# Patient Record
Sex: FEMALE | Race: ASIAN | NOT HISPANIC OR LATINO | Employment: PART TIME | URBAN - METROPOLITAN AREA
[De-identification: names, ages, dates, MRNs, and addresses within clinical notes are randomized per-mention and may not be internally consistent; named-entity substitution may affect disease eponyms.]

---

## 2023-02-08 ENCOUNTER — OFFICE VISIT (OUTPATIENT)
Dept: OBGYN CLINIC | Facility: CLINIC | Age: 48
End: 2023-02-08

## 2023-02-08 ENCOUNTER — TELEPHONE (OUTPATIENT)
Dept: GYNECOLOGIC ONCOLOGY | Facility: CLINIC | Age: 48
End: 2023-02-08

## 2023-02-08 ENCOUNTER — APPOINTMENT (OUTPATIENT)
Dept: LAB | Facility: CLINIC | Age: 48
End: 2023-02-08

## 2023-02-08 VITALS
SYSTOLIC BLOOD PRESSURE: 122 MMHG | HEIGHT: 63 IN | DIASTOLIC BLOOD PRESSURE: 62 MMHG | BODY MASS INDEX: 28.46 KG/M2 | WEIGHT: 160.6 LBS

## 2023-02-08 DIAGNOSIS — N84.1 CERVICAL POLYP: ICD-10-CM

## 2023-02-08 DIAGNOSIS — R19.00 PELVIC MASS: Primary | ICD-10-CM

## 2023-02-08 DIAGNOSIS — R19.00 ABDOMINAL MASS, UNSPECIFIED ABDOMINAL LOCATION: Primary | ICD-10-CM

## 2023-02-08 DIAGNOSIS — N95.0 POSTMENOPAUSAL BLEEDING: ICD-10-CM

## 2023-02-08 DIAGNOSIS — R19.00 ABDOMINAL MASS, UNSPECIFIED ABDOMINAL LOCATION: ICD-10-CM

## 2023-02-08 LAB
ESTRADIOL SERPL-MCNC: 45 PG/ML
FSH SERPL-ACNC: 28.7 MIU/ML

## 2023-02-08 RX ORDER — DIPHENOXYLATE HYDROCHLORIDE AND ATROPINE SULFATE 2.5; .025 MG/1; MG/1
1 TABLET ORAL DAILY
COMMUNITY

## 2023-02-08 RX ORDER — LAMOTRIGINE 100 MG/1
TABLET ORAL
COMMUNITY
Start: 2018-02-06

## 2023-02-08 NOTE — TELEPHONE ENCOUNTER
Patient called into the office to schedule an appointment   Patient can be reached back @ [phone]

## 2023-02-08 NOTE — TELEPHONE ENCOUNTER
Patient scheduled with Dr Germaine Ruiz on 2/13/23  Plan for pre-visit CT imaging  Order placed  Reviewed allergy list as well as Network IV prep protocol  Patient does not require pre-medication for shellfish allergy, as her reaction is not classified as "severe" per protocol

## 2023-02-08 NOTE — PROGRESS NOTES
Gynecology   Troy Arambula 52 y o  female MRN: [id number]    Assessment/Plan     1  Abdominal mass, unspecified abdominal location  2  Postmenopausal bleeding  3  Cervical polyp    - Follicle stimulating hormone; Future  - Estradiol; Future  - IGP, Aptima HPV, Rfx 16/18,45  - Pathology Report  - GYN ONC; spoke with Dr Jaleesa Vickers re: appointment, imaging  They will see patient ASAP then decide on further imaging  HPI:  Troy Arambula is a 52 y o  female who presents with 1 5 year h/o abdominal distension and intermittent VB  Notes menopause @ age 45  Of note, struggled with anorexia from depression at the time  Denies any menopausal symptoms  Historical Information   Past Medical History:   Diagnosis Date   • Depression     Lamictal to control bipolar   • Epilepsy ([facility] [address] )     Teenage into adulthood  No seizures in 19 years   • Miscarriage     Elective  @ 12years old  Miscarriage between 1st and 2nd child   • Varicella     Childhood, 3rd grade or so  Very mild case     Past Surgical History:   Procedure Laterality Date   • TUBAL LIGATION       OB/GYN History:   OB History        4    Para   2    Term   2            AB   1    Living   2       SAB   1    IAB        Ectopic        Multiple        Live Births   2                 History reviewed  No pertinent family history    Social History   Social History     Substance and Sexual Activity   Alcohol Use Not Currently     Social History     Substance and Sexual Activity   Drug Use Yes   • Types: Marijuana     Social History     Tobacco Use   Smoking Status Every Day   • Packs/day: 0 50   • Years: 25 00   • Pack years: 12 50   • Types: Cigarettes   Smokeless Tobacco Never     E-Cigarette/Vaping   • E-Cigarette Use Never User      E-Cigarette/Vaping Substances   • Nicotine No    • THC No    • CBD No    • Flavoring No    • Other No    • Unknown No        Meds/Allergies   Current Outpatient Medications on File Prior to Visit   Medication Sig   • CALCIUM PO Take by mouth   • Dihydrotachysterol (DHT PO) Take by mouth   • lamoTRIgine (LaMICtal) 100 mg tablet    • multivitamin (THERAGRAN) TABS Take 1 tablet by mouth daily   • VITAMIN D PO Take by mouth     No current facility-administered medications on file prior to visit  Allergies   Allergen Reactions   • Latex Hives, Itching and Rash   • Medical Tape Hives, Itching and Rash   • Shellfish Allergy - Food Allergy Hives, Itching, Rash, Swelling and Vomiting       Review of Systems   Constitutional: Negative for decreased appetite, fever, malaise/fatigue and weight loss  Respiratory: Negative for cough, shortness of breath, sputum production and wheezing  Gastrointestinal: Positive for bloating  Negative for abdominal pain, change in bowel habit and nausea  Genitourinary: Positive for non-menstrual bleeding  Negative for flank pain, pelvic pain and urgency  Objective   Vitals: Blood pressure 122/62, height 5' 2 5" (1 588 m), weight 72 8 kg (160 lb 9 6 oz), last menstrual period 12/26/2022  Physical Exam  Constitutional:       Appearance: Normal appearance  Genitourinary:      Vulva normal       No inguinal adenopathy present in the right or left side  No vaginal discharge or bleeding  Adnexa exam comments: Large abdominopelvic mass extending into epigatrium  Cervical polyp present  Uterus is not enlarged or tender  HENT:      Head: Normocephalic  Cardiovascular:      Rate and Rhythm: Normal rate and regular rhythm  Pulmonary:      Effort: Pulmonary effort is normal    Abdominal:      General: There is distension  Palpations: Abdomen is soft  There is mass  Tenderness: There is no abdominal tenderness  Musculoskeletal:         General: No swelling  Lymphadenopathy:      Lower Body: No right inguinal adenopathy  No left inguinal adenopathy  Neurological:      General: No focal deficit present        Mental Status: She is alert and oriented to person, place, and time  Skin:     General: Skin is warm and dry  Coloration: Skin is not jaundiced or pale  Psychiatric:         Mood and Affect: Mood normal          Behavior: Behavior normal    Vitals reviewed  Limited bedside TA sono:  Uterus appears small and compressed in the pelvis  Large mass with mixed fluid and solid components extending to approximately 54UP above umbilicus  Possible ovarian tissue seen in RUQ  Slight tender in LLQ

## 2023-02-09 ENCOUNTER — TELEPHONE (OUTPATIENT)
Dept: OBGYN CLINIC | Facility: CLINIC | Age: 48
End: 2023-02-09

## 2023-02-09 ENCOUNTER — APPOINTMENT (OUTPATIENT)
Dept: LAB | Facility: CLINIC | Age: 48
End: 2023-02-09

## 2023-02-09 ENCOUNTER — TELEPHONE (OUTPATIENT)
Dept: GYNECOLOGIC ONCOLOGY | Facility: CLINIC | Age: 48
End: 2023-02-09

## 2023-02-09 DIAGNOSIS — R19.00 PELVIC MASS: ICD-10-CM

## 2023-02-09 DIAGNOSIS — R19.00 PELVIC MASS: Primary | ICD-10-CM

## 2023-02-09 LAB
BUN SERPL-MCNC: 6 MG/DL (ref 5–25)
CREAT SERPL-MCNC: 0.8 MG/DL (ref 0.6–1.3)
GFR SERPL CREATININE-BSD FRML MDRD: 88 ML/MIN/1.73SQ M

## 2023-02-09 NOTE — TELEPHONE ENCOUNTER
Called and informed of there CT SCAN appointment  Provided the patient with the date, time and location  Patient was in agreement of this  Answered all questions prior to ending the call

## 2023-02-09 NOTE — TELEPHONE ENCOUNTER
----- Message from Dariusz Pratt MD sent at 2/9/2023 10:51 AM EST -----  Notify she is not menopausal; she is seeing GYN ONC on Monday

## 2023-02-10 ENCOUNTER — TELEPHONE (OUTPATIENT)
Dept: OBGYN CLINIC | Facility: CLINIC | Age: 48
End: 2023-02-10

## 2023-02-10 NOTE — TELEPHONE ENCOUNTER
Spoke with Shante at [address] for Cervical Polyp was Cervical polyp- Not endometrium

## 2023-02-11 ENCOUNTER — HOSPITAL ENCOUNTER (OUTPATIENT)
Dept: RADIOLOGY | Facility: HOSPITAL | Age: 48
Discharge: HOME | End: 2023-02-11

## 2023-02-11 DIAGNOSIS — R19.00 PELVIC MASS: ICD-10-CM

## 2023-02-11 RX ADMIN — IOHEXOL 100 ML: 350 INJECTION, SOLUTION INTRAVENOUS at 16:07

## 2023-02-13 ENCOUNTER — CONSULT (OUTPATIENT)
Dept: GYNECOLOGIC ONCOLOGY | Facility: CLINIC | Age: 48
End: 2023-02-13

## 2023-02-13 VITALS
BODY MASS INDEX: 28.53 KG/M2 | HEIGHT: 63 IN | DIASTOLIC BLOOD PRESSURE: 80 MMHG | OXYGEN SATURATION: 98 % | WEIGHT: 161 LBS | TEMPERATURE: 97.5 F | SYSTOLIC BLOOD PRESSURE: 120 MMHG | HEART RATE: 97 BPM

## 2023-02-13 DIAGNOSIS — Z12.31 ENCOUNTER FOR SCREENING MAMMOGRAM FOR MALIGNANT NEOPLASM OF BREAST: ICD-10-CM

## 2023-02-13 DIAGNOSIS — R19.00 ABDOMINAL MASS, UNSPECIFIED ABDOMINAL LOCATION: ICD-10-CM

## 2023-02-13 DIAGNOSIS — R19.00 PELVIC MASS: Primary | ICD-10-CM

## 2023-02-13 LAB
CYTOLOGIST CVX/VAG CYTO: NORMAL
DX ICD CODE: NORMAL
HPV GENOTYPE REFLEX: NORMAL
HPV I/H RISK 4 DNA CVX QL PROBE+SIG AMP: NEGATIVE
OTHER STN SPEC: NORMAL
PATH REPORT.FINAL DX SPEC: NORMAL
PATH REPORT.SITE OF ORIGIN SPEC: NORMAL
PAYMENT PROCEDURE: NORMAL
SL AMB .: NORMAL
SL AMB NOTE:: NORMAL
SL AMB SPECIMEN ADEQUACY: NORMAL
SL AMB TEST METHODOLOGY: NORMAL

## 2023-02-13 RX ORDER — HEPARIN SODIUM 5000 [USP'U]/ML
5000 INJECTION, SOLUTION INTRAVENOUS; SUBCUTANEOUS
Status: CANCELLED | OUTPATIENT
Start: 2023-02-23 | End: 2023-02-24

## 2023-02-13 RX ORDER — GABAPENTIN 100 MG/1
100 CAPSULE ORAL ONCE
Status: CANCELLED | OUTPATIENT
Start: 2023-02-23 | End: 2023-02-13

## 2023-02-13 RX ORDER — ACETAMINOPHEN 325 MG/1
975 TABLET ORAL ONCE
Status: CANCELLED | OUTPATIENT
Start: 2023-02-23 | End: 2023-02-13

## 2023-02-13 RX ORDER — CELECOXIB 200 MG/1
200 CAPSULE ORAL ONCE
Status: CANCELLED | OUTPATIENT
Start: 2023-02-23 | End: 2023-02-13

## 2023-02-13 NOTE — ASSESSMENT & PLAN NOTE
Approximately 22 cm simple appearing cystic large abdominal pelvic mass  Likely arising from one of the ovaries  I discussed with patient differential diagnosis including benign, borderline and malignant pathology  Likelihood of borderline or malignancy appears quite small based on patient's history and imaging characteristics  Regardless, given symptomatic nature and size I have recommended definitive surgical therapy and patient has consented to proceed with possible robotic versus open resection of pelvic mass, possible hysterectomy, bowel salpingo-oophorectomy, staging and all other indicated procedures  Patient has had some issues with oligomenorrhea alternating with unpredictable episodes of bleeding and after discussing the potential benefit of proceeding with hysterectomy to allow for contained vaginal delivery as opposed to performing laparotomy which would result in more pain and slower recovery, patient has agreed to proceed with hysterectomy and bilateral salpingectomy  If contralateral ovary appears healthy, and after discussing pros and cons of ovarian preservation, she has elected to keep contralateral ovary in situ  Patient has excellent exercise tolerance and no additional cardiovascular work-up is indicated  Preoperative testing as per procedure pass will include type and screen, CBC and hemoglobin A1c  Will defer tumor markers to perioperative period once frozen section is obtained as those may be unnecessary and noninformative  I discussed with patient indication, risks, benefits and alternatives of surgical exploration  We discussed possibility of bleeding requiring blood transfusion, life-threatening infections requiring additional procedures, injuries to surrounding organs such as bladder, ureters, gastrointestinal tract and or neurovascular structures    Additionally, we discussed general risks associated to stress of surgery such as venous thromboembolism, acute myocardial events and stroke  All questions answered to patient's satisfaction  Patient consents to blood transfusions if those are deemed necessary during the course of her care, she understands potential risks include but are not limited to hypersensitivity reactions and infections with blood-borne pathogens she agrees and wants to proceed  Informed consent form signed

## 2023-02-13 NOTE — LETTER
February 13, 2023     Albaro Martinez, [address]     Patient: Savage Organ   YOB: 1975   Date of Visit: 2/13/2023       Dear Samia Allen: Thank you for referring Thompson Organ to me for evaluation  Below are my notes for this consultation  If you have questions, please do not hesitate to call me  I look forward to following your patient along with you  Sincerely,        Obdulio Crawford MD        CC: No Recipients  Obdulio Crawford MD  2/13/2023  3:57 PM  Incomplete  Assessment/Plan:    Problem List Items Addressed This Visit        Other    Pelvic mass - Primary     Approximately 22 cm simple appearing cystic large abdominal pelvic mass  Likely arising from one of the ovaries  I discussed with patient differential diagnosis including benign, borderline and malignant pathology  Likelihood of borderline or malignancy appears quite small based on patient's history and imaging characteristics  Regardless, given symptomatic nature and size I have recommended definitive surgical therapy and patient has consented to proceed with possible robotic versus open resection of pelvic mass, possible hysterectomy, bowel salpingo-oophorectomy, staging and all other indicated procedures  Patient has had some issues with oligomenorrhea alternating with unpredictable episodes of bleeding and after discussing the potential benefit of proceeding with hysterectomy to allow for contained vaginal delivery as opposed to performing laparotomy which would result in more pain and slower recovery, patient has agreed to proceed with hysterectomy and bilateral salpingectomy  If contralateral ovary appears healthy, and after discussing pros and cons of ovarian preservation, she has elected to keep contralateral ovary in situ  Patient has excellent exercise tolerance and no additional cardiovascular work-up is indicated    Preoperative testing as per procedure pass will include type and screen, CBC and hemoglobin A1c  Will defer tumor markers to perioperative period once frozen section is obtained as those may be unnecessary and noninformative  I discussed with patient indication, risks, benefits and alternatives of surgical exploration  We discussed possibility of bleeding requiring blood transfusion, life-threatening infections requiring additional procedures, injuries to surrounding organs such as bladder, ureters, gastrointestinal tract and or neurovascular structures  Additionally, we discussed general risks associated to stress of surgery such as venous thromboembolism, acute myocardial events and stroke  All questions answered to patient's satisfaction  Patient consents to blood transfusions if those are deemed necessary during the course of her care, she understands potential risks include but are not limited to hypersensitivity reactions and infections with blood-borne pathogens she agrees and wants to proceed  Informed consent form signed  Relevant Orders    Mammo screening bilateral w 3d & cad    Case request operating room: POSSIBLE ROBOTIC VS  OPEN RESECTION OF PELVIC MASS, POSSIBLE HYSTERECTOMY, BILATERAL SALPINGO-OOPHORECTOMY, STAGING AND ALL OTHER INDICATED PROCEDURES (Completed)    CBC and differential    Type and screen    Encounter for screening mammogram for malignant neoplasm of breast     Overdue for mammogram   Mammogram ordered  Relevant Orders    Mammo screening bilateral w 3d & cad   Other Visit Diagnoses     Abdominal mass, unspecified abdominal location        Relevant Orders    Case request operating room: POSSIBLE ROBOTIC VS  OPEN RESECTION OF PELVIC MASS, POSSIBLE HYSTERECTOMY, BILATERAL SALPINGO-OOPHORECTOMY, STAGING AND ALL OTHER INDICATED PROCEDURES (Completed)    CBC and differential    Type and screen            CHIEF COMPLAINT:   Here for consultation definitive treatment for large abdominal pelvic mass      Patient ID: Jesusita Romberg is a 52 y o  female  HPI  49-year-old G4, P2 with 2 prior vaginal deliveries and history of well-controlled bipolar disorder and seizure disorder  Last seizure more than 15 years ago  She has noted increase in abdominal girth which is now attributable to pelvic mass progressing over the last 1 5 to 2 years  She did not seek care earlier due to changes in her 's employment situation and insurance barriers  Recently consulted with Dr Tristan Castillo and office bedside ultrasound demonstrated large cystic abdominal pelvic mass  After discussion with us we decided to order a previous it CT scan of the chest, abdomen and pelvis with contrast which has confirmed the presence of a 22 cm simple appearing cystic ovarian lesion  No lymphadenopathy  No ascites  No carcinomatosis  No  stigmata of malignancy noted  Patient presents today to discuss potential management options  Reports abdominal pelvic heaviness  Of note she also reports prolonged episodes of oligomenorrhea alternating with episodes of rectal bleeding  Recent FSH ordered by primary gynecology confirms premenopausal status    Never had a mammogram    No family history of malignancy  The following portions of the patient's history were reviewed and updated as appropriate: allergies, current medications, past family history, past medical history, past social history, past surgical history and problem list     Review of Systems  As per [facility]  Top review of systems otherwise noncontributory  Current Outpatient Medications   Medication Sig Dispense Refill   • CALCIUM PO Take by mouth     • Dihydrotachysterol (DHT PO) Take by mouth     • lamoTRIgine (LaMICtal) 100 mg tablet      • multivitamin (THERAGRAN) TABS Take 1 tablet by mouth daily     • VITAMIN D PO Take by mouth       No current facility-administered medications for this visit             Objective:    Blood pressure 120/80, pulse 97, temperature 97 5 °F (36 4 °C), temperature source Temporal, height 5' 2 5" (1 588 m), weight 73 kg (161 lb), SpO2 98 %  Body mass index is 28 98 kg/m²  Body surface area is 1 75 meters squared  Physical Exam  Vitals reviewed  Exam conducted with a chaperone present  Constitutional:       Appearance: Normal appearance  She is not ill-appearing or toxic-appearing  Eyes:      General: No scleral icterus  Right eye: No discharge  Left eye: No discharge  Conjunctiva/sclera: Conjunctivae normal    Cardiovascular:      Rate and Rhythm: Normal rate and regular rhythm  Heart sounds: Normal heart sounds  No murmur heard  Pulmonary:      Effort: Pulmonary effort is normal  No respiratory distress  Breath sounds: Normal breath sounds  No stridor  No wheezing or rhonchi  Abdominal:      General: There is distension  Palpations: There is mass (Large abdominal pelvic mass extends from suprapubic region to the epigastrium  )  Tenderness: There is no abdominal tenderness  Hernia: No hernia is present  Genitourinary:     Comments: Normal external female genitalia  Normal [place]tholin's and [place]'s glands  Normal urethral meatus and no evidence of urethral discharge or masses  Bladder without fullness mass or tenderness  Vagina without lesion or discharge  No significant pelvic organ prolapse noted  Cervix with nabothian cysts, grossly normal appearing without visible lesions  Large abdominopelvic mass extends to the epigastrium and appears smooth, mobile and nontender  Anus without fissure of lesion  Musculoskeletal:      Right lower leg: No edema  Left lower leg: No edema  Neurological:      Mental Status: She is alert  2/11/2023 Study Result    Narrative & Impression   CT CHEST, ABDOMEN AND PELVIS WITH IV CONTRAST     INDICATION: Pelvic mass  R19 00:  Intra-abdominal and pelvic swelling, mass and lump, unspecified site      COMPARISON:  None      TECHNIQUE: CT examination of the chest, abdomen and pelvis was performed  Axial, sagittal, and coronal 2D reformatted images were created from the source data and submitted for interpretation      Radiation dose length product (DLP) for this visit:  826 05 mGy-cm   This examination, like all CT scans performed in the [facility], was performed utilizing techniques to minimize radiation dose exposure, including the use of iterative   reconstruction and automated exposure control      IV Contrast:  100 mL of iohexol (OMNIPAQUE)  Enteric Contrast: Enteric contrast was administered      FINDINGS:     CHEST     LUNGS:  Lungs are clear  There is no tracheal or endobronchial lesion      PLEURA:  Unremarkable      HEART/GREAT VESSELS: Heart is unremarkable for patient's age  No thoracic aortic aneurysm  Small right pericardial cyst measuring up to 1 7 cm      MEDIASTINUM AND ESTIVEN:  Unremarkable      CHEST WALL AND LOWER NECK:  Unremarkable      ABDOMEN     LIVER/BILIARY TREE:  One or more subcentimeter sharply circumscribed low-density hepatic lesion(s) are noted, too small to accurately characterize, but statistically most likely to represent subcentimeter hepatic cysts  No suspicious solid hepatic   lesion is identified  Hepatic contours are normal   No biliary dilatation      GALLBLADDER:  There are gallstone(s) within the gallbladder, without pericholecystic inflammatory changes      SPLEEN:  Unremarkable      PANCREAS:  Unremarkable      ADRENAL GLANDS:  Unremarkable      KIDNEYS/URETERS:  Mild bilateral hydroureteronephrosis secondary to the large pelvic mass      STOMACH AND BOWEL:  Displaced bowel laterally on both sides of the abdomen secondary to the large pelvic mass  No acute findings      APPENDIX:  No findings to suggest appendicitis      ABDOMINOPELVIC CAVITY:  Mild pelvic ascites  No free air      VESSELS:  Unremarkable for patient's age      PELVIS     REPRODUCTIVE ORGANS:  Large cystic mass arising from the pelvis   Axial measurements of 21 8 x 14 7 cm  This measures 21 4 cm in cranial caudad dimension  Difficult to assess the origin given the size but probably arising from the right adnexa  Peripheral   isodensity at the left aspect of the cystic mass could be related to wall thickening  Otherwise no obvious solid component      Patchy focal hyperdensity at the uterus anteriorly may be related to underlying fibroid      URINARY BLADDER:  Unremarkable      ABDOMINAL WALL/INGUINAL REGIONS:  Unremarkable      OSSEOUS STRUCTURES:  No acute fracture or destructive osseous lesion      IMPRESSION:     1   Large cystic mass probably arising from the right adnexa with maximum measurement of 21 8 cm  Findings suspicious for ovarian neoplasm  2  Mild bilateral hydroureteronephrosis secondary to the pelvic mass  3   Mild ascites in the pelvis  4  Small right pericardial cyst identified likely incidental benign finding4   Otherwise no suspicious masses in the chest            The study was marked in [facility] for immediate notification      Workstation performed: [id number]     March MD Missael, Maycol Drew, [facility]  2/13/2023  3:56 PM

## 2023-02-13 NOTE — PROGRESS NOTES
Assessment/Plan:    Problem List Items Addressed This Visit        Other    Pelvic mass - Primary     Approximately 22 cm simple appearing cystic large abdominal pelvic mass  Likely arising from one of the ovaries  I discussed with patient differential diagnosis including benign, borderline and malignant pathology  Likelihood of borderline or malignancy appears quite small based on patient's history and imaging characteristics  Regardless, given symptomatic nature and size I have recommended definitive surgical therapy and patient has consented to proceed with possible robotic versus open resection of pelvic mass, possible hysterectomy, bowel salpingo-oophorectomy, staging and all other indicated procedures  Patient has had some issues with oligomenorrhea alternating with unpredictable episodes of bleeding and after discussing the potential benefit of proceeding with hysterectomy to allow for contained vaginal delivery as opposed to performing laparotomy which would result in more pain and slower recovery, patient has agreed to proceed with hysterectomy and bilateral salpingectomy  If contralateral ovary appears healthy, and after discussing pros and cons of ovarian preservation, she has elected to keep contralateral ovary in situ  Patient has excellent exercise tolerance and no additional cardiovascular work-up is indicated  Preoperative testing as per procedure pass will include type and screen, CBC and hemoglobin A1c  Will defer tumor markers to perioperative period once frozen section is obtained as those may be unnecessary and noninformative  I discussed with patient indication, risks, benefits and alternatives of surgical exploration  We discussed possibility of bleeding requiring blood transfusion, life-threatening infections requiring additional procedures, injuries to surrounding organs such as bladder, ureters, gastrointestinal tract and or neurovascular structures    Additionally, we discussed general risks associated to stress of surgery such as venous thromboembolism, acute myocardial events and stroke  All questions answered to patient's satisfaction  Patient consents to blood transfusions if those are deemed necessary during the course of her care, she understands potential risks include but are not limited to hypersensitivity reactions and infections with blood-borne pathogens she agrees and wants to proceed  Informed consent form signed  Relevant Orders    Mammo screening bilateral w 3d & cad    Case request operating room: POSSIBLE ROBOTIC VS  OPEN RESECTION OF PELVIC MASS, POSSIBLE HYSTERECTOMY, BILATERAL SALPINGO-OOPHORECTOMY, STAGING AND ALL OTHER INDICATED PROCEDURES (Completed)    CBC and differential    Type and screen    Encounter for screening mammogram for malignant neoplasm of breast     Overdue for mammogram   Mammogram ordered  Relevant Orders    Mammo screening bilateral w 3d & cad   Other Visit Diagnoses     Abdominal mass, unspecified abdominal location        Relevant Orders    Case request operating room: POSSIBLE ROBOTIC VS  OPEN RESECTION OF PELVIC MASS, POSSIBLE HYSTERECTOMY, BILATERAL SALPINGO-OOPHORECTOMY, STAGING AND ALL OTHER INDICATED PROCEDURES (Completed)    CBC and differential    Type and screen            CHIEF COMPLAINT:   Here for consultation definitive treatment for large abdominal pelvic mass  Patient ID: Malaika Guardado is a 52 y o  female  HPI  49-year-old G4, P2 with 2 prior vaginal deliveries and history of well-controlled bipolar disorder and seizure disorder  Last seizure more than 15 years ago  She has noted increase in abdominal girth which is now attributable to pelvic mass progressing over the last 1 5 to 2 years  She did not seek care earlier due to changes in her 's employment situation and insurance barriers    Recently consulted with Dr Daniel Valdez and office bedside ultrasound demonstrated large cystic abdominal pelvic mass  After discussion with us we decided to order a previous it CT scan of the chest, abdomen and pelvis with contrast which has confirmed the presence of a 22 cm simple appearing cystic ovarian lesion  No lymphadenopathy  No ascites  No carcinomatosis  No  stigmata of malignancy noted  Patient presents today to discuss potential management options  Reports abdominal pelvic heaviness  Of note she also reports prolonged episodes of oligomenorrhea alternating with episodes of rectal bleeding  Recent FSH ordered by primary gynecology confirms premenopausal status    Never had a mammogram    No family history of malignancy  The following portions of the patient's history were reviewed and updated as appropriate: allergies, current medications, past family history, past medical history, past social history, past surgical history and problem list     Review of Systems  As per HPI  Top review of systems otherwise noncontributory  Current Outpatient Medications   Medication Sig Dispense Refill   • CALCIUM PO Take by mouth     • Dihydrotachysterol (DHT PO) Take by mouth     • lamoTRIgine (LaMICtal) 100 mg tablet      • multivitamin (THERAGRAN) TABS Take 1 tablet by mouth daily     • VITAMIN D PO Take by mouth       No current facility-administered medications for this visit  Objective:    Blood pressure 120/80, pulse 97, temperature 97 5 °F (36 4 °C), temperature source Temporal, height 5' 2 5" (1 588 m), weight 73 kg (161 lb), SpO2 98 %  Body mass index is 28 98 kg/m²  Body surface area is 1 75 meters squared  Physical Exam  Vitals reviewed  Exam conducted with a chaperone present  Constitutional:       Appearance: Normal appearance  She is not ill-appearing or toxic-appearing  Eyes:      General: No scleral icterus  Right eye: No discharge  Left eye: No discharge        Conjunctiva/sclera: Conjunctivae normal    Cardiovascular:      Rate and Rhythm: Normal rate and regular rhythm  Heart sounds: Normal heart sounds  No murmur heard  Pulmonary:      Effort: Pulmonary effort is normal  No respiratory distress  Breath sounds: Normal breath sounds  No stridor  No wheezing or rhonchi  Abdominal:      General: There is distension  Palpations: There is mass (Large abdominal pelvic mass extends from suprapubic region to the epigastrium  )  Tenderness: There is no abdominal tenderness  Hernia: No hernia is present  Genitourinary:     Comments: Normal external female genitalia  Normal Bartholin's and Rhodes's glands  Normal urethral meatus and no evidence of urethral discharge or masses  Bladder without fullness mass or tenderness  Vagina without lesion or discharge  No significant pelvic organ prolapse noted  Cervix with nabothian cysts, grossly normal appearing without visible lesions  Large abdominopelvic mass extends to the epigastrium and appears smooth, mobile and nontender  Anus without fissure of lesion  Musculoskeletal:      Right lower leg: No edema  Left lower leg: No edema  Neurological:      Mental Status: She is alert  2/11/2023 Study Result    Narrative & Impression   CT CHEST, ABDOMEN AND PELVIS WITH IV CONTRAST     INDICATION: Pelvic mass  R19 00: Intra-abdominal and pelvic swelling, mass and lump, unspecified site      COMPARISON:  None      TECHNIQUE: CT examination of the chest, abdomen and pelvis was performed  Axial, sagittal, and coronal 2D reformatted images were created from the source data and submitted for interpretation      Radiation dose length product (DLP) for this visit:  826 05 mGy-cm     This examination, like all CT scans performed in the [facility], was performed utilizing techniques to minimize radiation dose exposure, including the use of iterative   reconstruction and automated exposure control      IV Contrast:  100 mL of iohexol (OMNIPAQUE)  Enteric Contrast: Enteric contrast was administered      FINDINGS:     CHEST     LUNGS:  Lungs are clear  There is no tracheal or endobronchial lesion      PLEURA:  Unremarkable      HEART/GREAT VESSELS: Heart is unremarkable for patient's age  No thoracic aortic aneurysm  Small right pericardial cyst measuring up to 1 7 cm      MEDIASTINUM AND ESTIVEN:  Unremarkable      CHEST WALL AND LOWER NECK:  Unremarkable      ABDOMEN     LIVER/BILIARY TREE:  One or more subcentimeter sharply circumscribed low-density hepatic lesion(s) are noted, too small to accurately characterize, but statistically most likely to represent subcentimeter hepatic cysts  No suspicious solid hepatic   lesion is identified  Hepatic contours are normal   No biliary dilatation      GALLBLADDER:  There are gallstone(s) within the gallbladder, without pericholecystic inflammatory changes      SPLEEN:  Unremarkable      PANCREAS:  Unremarkable      ADRENAL GLANDS:  Unremarkable      KIDNEYS/URETERS:  Mild bilateral hydroureteronephrosis secondary to the large pelvic mass      STOMACH AND BOWEL:  Displaced bowel laterally on both sides of the abdomen secondary to the large pelvic mass  No acute findings      APPENDIX:  No findings to suggest appendicitis      ABDOMINOPELVIC CAVITY:  Mild pelvic ascites  No free air      VESSELS:  Unremarkable for patient's age      PELVIS     REPRODUCTIVE ORGANS:  Large cystic mass arising from the pelvis  Axial measurements of 21 8 x 14 7 cm  This measures 21 4 cm in cranial caudad dimension  Difficult to assess the origin given the size but probably arising from the right adnexa  Peripheral   isodensity at the left aspect of the cystic mass could be related to wall thickening   Otherwise no obvious solid component      Patchy focal hyperdensity at the uterus anteriorly may be related to underlying fibroid      URINARY BLADDER:  Unremarkable      ABDOMINAL WALL/INGUINAL REGIONS:  Unremarkable      OSSEOUS STRUCTURES:  No acute fracture or destructive osseous lesion      IMPRESSION:     1   Large cystic mass probably arising from the right adnexa with maximum measurement of 21 8 cm  Findings suspicious for ovarian neoplasm  2  Mild bilateral hydroureteronephrosis secondary to the pelvic mass  3   Mild ascites in the pelvis  4  Small right pericardial cyst identified likely incidental benign finding4   Otherwise no suspicious masses in the chest            The study was marked in [facility] for immediate notification      Workstation performed: [id number]     Susan Maciel MD, Miguel Red, [facility]  2/13/2023  3:56 PM

## 2023-02-13 NOTE — PATIENT INSTRUCTIONS
Preoperative laboratory testing as ordered  Instructions as per operative packet  Call if you have any questions or concerns regarding upcoming surgery    Keep appointment for preoperative mammogram

## 2023-02-13 NOTE — H&P (VIEW-ONLY)
Assessment/Plan:    Problem List Items Addressed This Visit        Other    Pelvic mass - Primary     Approximately 22 cm simple appearing cystic large abdominal pelvic mass  Likely arising from one of the ovaries  I discussed with patient differential diagnosis including benign, borderline and malignant pathology  Likelihood of borderline or malignancy appears quite small based on patient's history and imaging characteristics  Regardless, given symptomatic nature and size I have recommended definitive surgical therapy and patient has consented to proceed with possible robotic versus open resection of pelvic mass, possible hysterectomy, bowel salpingo-oophorectomy, staging and all other indicated procedures  Patient has had some issues with oligomenorrhea alternating with unpredictable episodes of bleeding and after discussing the potential benefit of proceeding with hysterectomy to allow for contained vaginal delivery as opposed to performing laparotomy which would result in more pain and slower recovery, patient has agreed to proceed with hysterectomy and bilateral salpingectomy  If contralateral ovary appears healthy, and after discussing pros and cons of ovarian preservation, she has elected to keep contralateral ovary in situ  Patient has excellent exercise tolerance and no additional cardiovascular work-up is indicated  Preoperative testing as per procedure pass will include type and screen, CBC and hemoglobin A1c  Will defer tumor markers to perioperative period once frozen section is obtained as those may be unnecessary and noninformative  I discussed with patient indication, risks, benefits and alternatives of surgical exploration  We discussed possibility of bleeding requiring blood transfusion, life-threatening infections requiring additional procedures, injuries to surrounding organs such as bladder, ureters, gastrointestinal tract and or neurovascular structures    Additionally, we discussed general risks associated to stress of surgery such as venous thromboembolism, acute myocardial events and stroke  All questions answered to patient's satisfaction  Patient consents to blood transfusions if those are deemed necessary during the course of her care, she understands potential risks include but are not limited to hypersensitivity reactions and infections with blood-borne pathogens she agrees and wants to proceed  Informed consent form signed  Relevant Orders    Mammo screening bilateral w 3d & cad    Case request operating room: POSSIBLE ROBOTIC VS  OPEN RESECTION OF PELVIC MASS, POSSIBLE HYSTERECTOMY, BILATERAL SALPINGO-OOPHORECTOMY, STAGING AND ALL OTHER INDICATED PROCEDURES (Completed)    CBC and differential    Type and screen    Encounter for screening mammogram for malignant neoplasm of breast     Overdue for mammogram   Mammogram ordered  Relevant Orders    Mammo screening bilateral w 3d & cad   Other Visit Diagnoses     Abdominal mass, unspecified abdominal location        Relevant Orders    Case request operating room: POSSIBLE ROBOTIC VS  OPEN RESECTION OF PELVIC MASS, POSSIBLE HYSTERECTOMY, BILATERAL SALPINGO-OOPHORECTOMY, STAGING AND ALL OTHER INDICATED PROCEDURES (Completed)    CBC and differential    Type and screen            CHIEF COMPLAINT:   Here for consultation definitive treatment for large abdominal pelvic mass  Patient ID: Brandon Arce is a 52 y o  female  HPI  51-year-old G4, P2 with 2 prior vaginal deliveries and history of well-controlled bipolar disorder and seizure disorder  Last seizure more than 15 years ago  She has noted increase in abdominal girth which is now attributable to pelvic mass progressing over the last 1 5 to 2 years  She did not seek care earlier due to changes in her 's employment situation and insurance barriers    Recently consulted with Dr Deb Liang and office bedside ultrasound demonstrated large cystic abdominal pelvic mass  After discussion with us we decided to order a previous it CT scan of the chest, abdomen and pelvis with contrast which has confirmed the presence of a 22 cm simple appearing cystic ovarian lesion  No lymphadenopathy  No ascites  No carcinomatosis  No  stigmata of malignancy noted  Patient presents today to discuss potential management options  Reports abdominal pelvic heaviness  Of note she also reports prolonged episodes of oligomenorrhea alternating with episodes of rectal bleeding  Recent FSH ordered by primary gynecology confirms premenopausal status    Never had a mammogram    No family history of malignancy  The following portions of the patient's history were reviewed and updated as appropriate: allergies, current medications, past family history, past medical history, past social history, past surgical history and problem list     Review of Systems  As per HPI  Top review of systems otherwise noncontributory  Current Outpatient Medications   Medication Sig Dispense Refill   • CALCIUM PO Take by mouth     • Dihydrotachysterol (DHT PO) Take by mouth     • lamoTRIgine (LaMICtal) 100 mg tablet      • multivitamin (THERAGRAN) TABS Take 1 tablet by mouth daily     • VITAMIN D PO Take by mouth       No current facility-administered medications for this visit  Objective:    Blood pressure 120/80, pulse 97, temperature 97 5 °F (36 4 °C), temperature source Temporal, height 5' 2 5" (1 588 m), weight 73 kg (161 lb), SpO2 98 %  Body mass index is 28 98 kg/m²  Body surface area is 1 75 meters squared  Physical Exam  Vitals reviewed  Exam conducted with a chaperone present  Constitutional:       Appearance: Normal appearance  She is not ill-appearing or toxic-appearing  Eyes:      General: No scleral icterus  Right eye: No discharge  Left eye: No discharge        Conjunctiva/sclera: Conjunctivae normal    Cardiovascular:      Rate and Rhythm: Normal rate and regular rhythm  Heart sounds: Normal heart sounds  No murmur heard  Pulmonary:      Effort: Pulmonary effort is normal  No respiratory distress  Breath sounds: Normal breath sounds  No stridor  No wheezing or rhonchi  Abdominal:      General: There is distension  Palpations: There is mass (Large abdominal pelvic mass extends from suprapubic region to the epigastrium  )  Tenderness: There is no abdominal tenderness  Hernia: No hernia is present  Genitourinary:     Comments: Normal external female genitalia  Normal Bartholin's and [place]'s glands  Normal urethral meatus and no evidence of urethral discharge or masses  Bladder without fullness mass or tenderness  Vagina without lesion or discharge  No significant pelvic organ prolapse noted  Cervix with nabothian cysts, grossly normal appearing without visible lesions  Large abdominopelvic mass extends to the epigastrium and appears smooth, mobile and nontender  Anus without fissure of lesion  Musculoskeletal:      Right lower leg: No edema  Left lower leg: No edema  Neurological:      Mental Status: She is alert  2/11/2023 Study Result    Narrative & Impression   CT CHEST, ABDOMEN AND PELVIS WITH IV CONTRAST     INDICATION: Pelvic mass  R19 00: Intra-abdominal and pelvic swelling, mass and lump, unspecified site      COMPARISON:  None      TECHNIQUE: CT examination of the chest, abdomen and pelvis was performed  Axial, sagittal, and coronal 2D reformatted images were created from the source data and submitted for interpretation      Radiation dose length product (DLP) for this visit:  826 05 mGy-cm     This examination, like all CT scans performed in the [facility], was performed utilizing techniques to minimize radiation dose exposure, including the use of iterative   reconstruction and automated exposure control      IV Contrast:  100 mL of iohexol (OMNIPAQUE)  Enteric Contrast: Enteric contrast was administered      FINDINGS:     CHEST     LUNGS:  Lungs are clear  There is no tracheal or endobronchial lesion      PLEURA:  Unremarkable      HEART/GREAT VESSELS: Heart is unremarkable for patient's age  No thoracic aortic aneurysm  Small right pericardial cyst measuring up to 1 7 cm      MEDIASTINUM AND ESTIVEN:  Unremarkable      CHEST WALL AND LOWER NECK:  Unremarkable      ABDOMEN     LIVER/BILIARY TREE:  One or more subcentimeter sharply circumscribed low-density hepatic lesion(s) are noted, too small to accurately characterize, but statistically most likely to represent subcentimeter hepatic cysts  No suspicious solid hepatic   lesion is identified  Hepatic contours are normal   No biliary dilatation      GALLBLADDER:  There are gallstone(s) within the gallbladder, without pericholecystic inflammatory changes      SPLEEN:  Unremarkable      PANCREAS:  Unremarkable      ADRENAL GLANDS:  Unremarkable      KIDNEYS/URETERS:  Mild bilateral hydroureteronephrosis secondary to the large pelvic mass      STOMACH AND BOWEL:  Displaced bowel laterally on both sides of the abdomen secondary to the large pelvic mass  No acute findings      APPENDIX:  No findings to suggest appendicitis      ABDOMINOPELVIC CAVITY:  Mild pelvic ascites  No free air      VESSELS:  Unremarkable for patient's age      PELVIS     REPRODUCTIVE ORGANS:  Large cystic mass arising from the pelvis  Axial measurements of 21 8 x 14 7 cm  This measures 21 4 cm in cranial caudad dimension  Difficult to assess the origin given the size but probably arising from the right adnexa  Peripheral   isodensity at the left aspect of the cystic mass could be related to wall thickening   Otherwise no obvious solid component      Patchy focal hyperdensity at the uterus anteriorly may be related to underlying fibroid      URINARY BLADDER:  Unremarkable      ABDOMINAL WALL/INGUINAL REGIONS:  Unremarkable      OSSEOUS STRUCTURES:  No acute fracture or destructive osseous lesion      IMPRESSION:     1   Large cystic mass probably arising from the right adnexa with maximum measurement of 21 8 cm  Findings suspicious for ovarian neoplasm  2  Mild bilateral hydroureteronephrosis secondary to the pelvic mass  3   Mild ascites in the pelvis  4  Small right pericardial cyst identified likely incidental benign finding4   Otherwise no suspicious masses in the chest            The study was marked in [facility] for immediate notification      Workstation performed: [id number]     Shelly Glasgow MD, [facility]  2/13/2023  3:56 PM

## 2023-02-14 ENCOUNTER — TELEPHONE (OUTPATIENT)
Dept: OBGYN CLINIC | Facility: CLINIC | Age: 48
End: 2023-02-14

## 2023-02-14 NOTE — TELEPHONE ENCOUNTER
Per comm consent lm to pt with results and recommendations of polyp and pap from [address]   All neg

## 2023-02-14 NOTE — TELEPHONE ENCOUNTER
----- Message from Sadie Carias MD sent at 2/14/2023  9:12 AM EST -----  Notify patient that polyp and Pap are normal; she is following with [facility]

## 2023-02-15 ENCOUNTER — ANESTHESIA EVENT (OUTPATIENT)
Dept: PERIOP | Facility: HOSPITAL | Age: 48
End: 2023-02-15

## 2023-02-15 ENCOUNTER — HOSPITAL ENCOUNTER (OUTPATIENT)
Dept: MAMMOGRAPHY | Facility: MEDICAL CENTER | Age: 48
Discharge: HOME | End: 2023-02-15

## 2023-02-15 VITALS — BODY MASS INDEX: 28.52 KG/M2 | WEIGHT: 160.94 LBS | HEIGHT: 63 IN

## 2023-02-15 DIAGNOSIS — Z12.31 ENCOUNTER FOR SCREENING MAMMOGRAM FOR MALIGNANT NEOPLASM OF BREAST: ICD-10-CM

## 2023-02-15 DIAGNOSIS — R19.00 PELVIC MASS: ICD-10-CM

## 2023-02-16 ENCOUNTER — APPOINTMENT (OUTPATIENT)
Dept: LAB | Facility: CLINIC | Age: 48
End: 2023-02-16

## 2023-02-16 DIAGNOSIS — Z01.818 PRE-OP EVALUATION: ICD-10-CM

## 2023-02-16 DIAGNOSIS — R19.00 PELVIC MASS: ICD-10-CM

## 2023-02-16 DIAGNOSIS — R19.00 ABDOMINAL MASS, UNSPECIFIED ABDOMINAL LOCATION: ICD-10-CM

## 2023-02-16 LAB
BASOPHILS # BLD AUTO: 0.03 THOUSANDS/ÂΜL (ref 0–0.1)
BASOPHILS NFR BLD AUTO: 0 % (ref 0–1)
EOSINOPHIL # BLD AUTO: 0.13 THOUSAND/ÂΜL (ref 0–0.61)
EOSINOPHIL NFR BLD AUTO: 2 % (ref 0–6)
ERYTHROCYTE [DISTWIDTH] IN BLOOD BY AUTOMATED COUNT: 13.8 % (ref 11.6–15.1)
HCT VFR BLD AUTO: 43.3 % (ref 34.8–46.1)
HGB BLD-MCNC: 14.1 G/DL (ref 11.5–15.4)
IMM GRANULOCYTES # BLD AUTO: 0.01 THOUSAND/UL (ref 0–0.2)
IMM GRANULOCYTES NFR BLD AUTO: 0 % (ref 0–2)
LYMPHOCYTES # BLD AUTO: 3.27 THOUSANDS/ÂΜL (ref 0.6–4.47)
LYMPHOCYTES NFR BLD AUTO: 41 % (ref 14–44)
MCH RBC QN AUTO: 31.1 PG (ref 26.8–34.3)
MCHC RBC AUTO-ENTMCNC: 32.6 G/DL (ref 31.4–37.4)
MCV RBC AUTO: 96 FL (ref 82–98)
MONOCYTES # BLD AUTO: 0.46 THOUSAND/ÂΜL (ref 0.17–1.22)
MONOCYTES NFR BLD AUTO: 6 % (ref 4–12)
NEUTROPHILS # BLD AUTO: 4.04 THOUSANDS/ÂΜL (ref 1.85–7.62)
NEUTS SEG NFR BLD AUTO: 51 % (ref 43–75)
NRBC BLD AUTO-RTO: 0 /100 WBCS
PLATELET # BLD AUTO: 553 THOUSANDS/UL (ref 149–390)
PMV BLD AUTO: 11 FL (ref 8.9–12.7)
RBC # BLD AUTO: 4.53 MILLION/UL (ref 3.81–5.12)
WBC # BLD AUTO: 7.94 THOUSAND/UL (ref 4.31–10.16)

## 2023-02-17 ENCOUNTER — LAB REQUISITION (OUTPATIENT)
Dept: LAB | Facility: HOSPITAL | Age: 48
End: 2023-02-17

## 2023-02-17 DIAGNOSIS — Z01.818 ENCOUNTER FOR OTHER PREPROCEDURAL EXAMINATION: ICD-10-CM

## 2023-02-17 LAB
ABO GROUP BLD: NORMAL
BLD GP AB SCN SERPL QL: NEGATIVE
RH BLD: POSITIVE
SPECIMEN EXPIRATION DATE: NORMAL

## 2023-02-23 ENCOUNTER — ANESTHESIA (OUTPATIENT)
Dept: PERIOP | Facility: HOSPITAL | Age: 48
End: 2023-02-23

## 2023-02-23 ENCOUNTER — HOSPITAL ENCOUNTER (OUTPATIENT)
Facility: HOSPITAL | Age: 48
Setting detail: OUTPATIENT SURGERY
Discharge: HOME | End: 2023-02-23
Attending: OBSTETRICS & GYNECOLOGY | Admitting: OBSTETRICS & GYNECOLOGY
Payer: COMMERCIAL

## 2023-02-23 VITALS
HEIGHT: 63 IN | SYSTOLIC BLOOD PRESSURE: 136 MMHG | BODY MASS INDEX: 28.67 KG/M2 | DIASTOLIC BLOOD PRESSURE: 63 MMHG | TEMPERATURE: 98.1 F | HEART RATE: 81 BPM | OXYGEN SATURATION: 94 % | RESPIRATION RATE: 16 BRPM | WEIGHT: 161.82 LBS

## 2023-02-23 DIAGNOSIS — R19.00 ABDOMINAL MASS, UNSPECIFIED ABDOMINAL LOCATION: ICD-10-CM

## 2023-02-23 DIAGNOSIS — R19.00 PELVIC MASS: ICD-10-CM

## 2023-02-23 DIAGNOSIS — Z90.710 S/P HYSTERECTOMY: Primary | ICD-10-CM

## 2023-02-23 LAB
ABO GROUP BLD: NORMAL
EST. AVERAGE GLUCOSE BLD GHB EST-MCNC: 105 MG/DL
EXT PREGNANCY TEST URINE: NEGATIVE
EXT. CONTROL: NORMAL
HBA1C MFR BLD: 5.3 %
RH BLD: POSITIVE

## 2023-02-23 PROCEDURE — 88341 IMHCHEM/IMCYTCHM EA ADD ANTB: CPT | Performed by: PATHOLOGY

## 2023-02-23 PROCEDURE — 44979 UNLISTED LAPS PX APPENDIX: CPT | Performed by: OBSTETRICS & GYNECOLOGY

## 2023-02-23 PROCEDURE — 88342 IMHCHEM/IMCYTCHM 1ST ANTB: CPT | Performed by: PATHOLOGY

## 2023-02-23 PROCEDURE — S2900 ROBOTIC SURGICAL SYSTEM: HCPCS | Performed by: OBSTETRICS & GYNECOLOGY

## 2023-02-23 PROCEDURE — 83036 HEMOGLOBIN GLYCOSYLATED A1C: CPT | Performed by: OBSTETRICS & GYNECOLOGY

## 2023-02-23 PROCEDURE — 88329 PATH CONSLTJ DRG SURG: CPT | Performed by: PATHOLOGY

## 2023-02-23 PROCEDURE — 88307 TISSUE EXAM BY PATHOLOGIST: CPT | Performed by: PATHOLOGY

## 2023-02-23 PROCEDURE — 88313 SPECIAL STAINS GROUP 2: CPT | Performed by: PATHOLOGY

## 2023-02-23 PROCEDURE — 81025 URINE PREGNANCY TEST: CPT | Performed by: ANESTHESIOLOGY

## 2023-02-23 PROCEDURE — 88302 TISSUE EXAM BY PATHOLOGIST: CPT | Performed by: PATHOLOGY

## 2023-02-23 PROCEDURE — NC001 PR NO CHARGE: Performed by: PHYSICIAN ASSISTANT

## 2023-02-23 PROCEDURE — 58571 TLH W/T/O 250 G OR LESS: CPT | Performed by: OBSTETRICS & GYNECOLOGY

## 2023-02-23 RX ORDER — BUPIVACAINE HYDROCHLORIDE 2.5 MG/ML
INJECTION, SOLUTION EPIDURAL; INFILTRATION; INTRACAUDAL AS NEEDED
Status: DISCONTINUED | OUTPATIENT
Start: 2023-02-23 | End: 2023-02-23 | Stop reason: HOSPADM

## 2023-02-23 RX ORDER — ACETAMINOPHEN 325 MG/1
975 TABLET ORAL ONCE
Status: COMPLETED | OUTPATIENT
Start: 2023-02-23 | End: 2023-02-23

## 2023-02-23 RX ORDER — HEPARIN SODIUM 5000 [USP'U]/ML
5000 INJECTION, SOLUTION INTRAVENOUS; SUBCUTANEOUS
Status: COMPLETED | OUTPATIENT
Start: 2023-02-23 | End: 2023-02-23

## 2023-02-23 RX ORDER — ONDANSETRON 2 MG/ML
INJECTION INTRAMUSCULAR; INTRAVENOUS AS NEEDED
Status: DISCONTINUED | OUTPATIENT
Start: 2023-02-23 | End: 2023-02-23

## 2023-02-23 RX ORDER — OXYCODONE HYDROCHLORIDE 5 MG/1
5 TABLET ORAL EVERY 6 HOURS PRN
Qty: 6 TABLET | Refills: 0 | Status: SHIPPED | OUTPATIENT
Start: 2023-02-23 | End: 2023-03-05

## 2023-02-23 RX ORDER — CELECOXIB 200 MG/1
200 CAPSULE ORAL ONCE
Status: COMPLETED | OUTPATIENT
Start: 2023-02-23 | End: 2023-02-23

## 2023-02-23 RX ORDER — ONDANSETRON 2 MG/ML
4 INJECTION INTRAMUSCULAR; INTRAVENOUS EVERY 6 HOURS PRN
Status: DISCONTINUED | OUTPATIENT
Start: 2023-02-23 | End: 2023-02-23 | Stop reason: HOSPADM

## 2023-02-23 RX ORDER — HYDROMORPHONE HCL/PF 1 MG/ML
SYRINGE (ML) INJECTION AS NEEDED
Status: DISCONTINUED | OUTPATIENT
Start: 2023-02-23 | End: 2023-02-23

## 2023-02-23 RX ORDER — AMOXICILLIN AND CLAVULANATE POTASSIUM 875; 125 MG/1; MG/1
1 TABLET, FILM COATED ORAL EVERY 12 HOURS SCHEDULED
Qty: 14 TABLET | Refills: 0 | Status: SHIPPED | OUTPATIENT
Start: 2023-02-23 | End: 2023-03-02

## 2023-02-23 RX ORDER — IBUPROFEN 600 MG/1
600 TABLET ORAL EVERY 6 HOURS PRN
Status: DISCONTINUED | OUTPATIENT
Start: 2023-02-23 | End: 2023-02-23 | Stop reason: HOSPADM

## 2023-02-23 RX ORDER — DEXAMETHASONE SODIUM PHOSPHATE 10 MG/ML
INJECTION, SOLUTION INTRAMUSCULAR; INTRAVENOUS AS NEEDED
Status: DISCONTINUED | OUTPATIENT
Start: 2023-02-23 | End: 2023-02-23

## 2023-02-23 RX ORDER — ACETAMINOPHEN 325 MG/1
975 TABLET ORAL EVERY 6 HOURS PRN
Status: DISCONTINUED | OUTPATIENT
Start: 2023-02-23 | End: 2023-02-23 | Stop reason: HOSPADM

## 2023-02-23 RX ORDER — GABAPENTIN 100 MG/1
100 CAPSULE ORAL ONCE
Status: COMPLETED | OUTPATIENT
Start: 2023-02-23 | End: 2023-02-23

## 2023-02-23 RX ORDER — SODIUM CHLORIDE, SODIUM LACTATE, POTASSIUM CHLORIDE, CALCIUM CHLORIDE 600; 310; 30; 20 MG/100ML; MG/100ML; MG/100ML; MG/100ML
INJECTION, SOLUTION INTRAVENOUS CONTINUOUS PRN
Status: DISCONTINUED | OUTPATIENT
Start: 2023-02-23 | End: 2023-02-23

## 2023-02-23 RX ORDER — GLYCOPYRROLATE 0.2 MG/ML
INJECTION INTRAMUSCULAR; INTRAVENOUS AS NEEDED
Status: DISCONTINUED | OUTPATIENT
Start: 2023-02-23 | End: 2023-02-23

## 2023-02-23 RX ORDER — CEFAZOLIN SODIUM 1 G/3ML
INJECTION, POWDER, FOR SOLUTION INTRAMUSCULAR; INTRAVENOUS AS NEEDED
Status: DISCONTINUED | OUTPATIENT
Start: 2023-02-23 | End: 2023-02-23

## 2023-02-23 RX ORDER — METRONIDAZOLE 500 MG/100ML
INJECTION, SOLUTION INTRAVENOUS CONTINUOUS PRN
Status: DISCONTINUED | OUTPATIENT
Start: 2023-02-23 | End: 2023-02-23

## 2023-02-23 RX ORDER — FENTANYL CITRATE/PF 50 MCG/ML
50 SYRINGE (ML) INJECTION
Status: DISCONTINUED | OUTPATIENT
Start: 2023-02-23 | End: 2023-02-23 | Stop reason: HOSPADM

## 2023-02-23 RX ORDER — MIDAZOLAM HYDROCHLORIDE 2 MG/2ML
INJECTION, SOLUTION INTRAMUSCULAR; INTRAVENOUS AS NEEDED
Status: DISCONTINUED | OUTPATIENT
Start: 2023-02-23 | End: 2023-02-23

## 2023-02-23 RX ORDER — ROCURONIUM BROMIDE 10 MG/ML
INJECTION, SOLUTION INTRAVENOUS AS NEEDED
Status: DISCONTINUED | OUTPATIENT
Start: 2023-02-23 | End: 2023-02-23

## 2023-02-23 RX ORDER — PROPOFOL 10 MG/ML
INJECTION, EMULSION INTRAVENOUS AS NEEDED
Status: DISCONTINUED | OUTPATIENT
Start: 2023-02-23 | End: 2023-02-23

## 2023-02-23 RX ORDER — SODIUM CHLORIDE 9 MG/ML
125 INJECTION, SOLUTION INTRAVENOUS CONTINUOUS
Status: DISCONTINUED | OUTPATIENT
Start: 2023-02-23 | End: 2023-02-23

## 2023-02-23 RX ORDER — ONDANSETRON 2 MG/ML
4 INJECTION INTRAMUSCULAR; INTRAVENOUS ONCE AS NEEDED
Status: DISCONTINUED | OUTPATIENT
Start: 2023-02-23 | End: 2023-02-23 | Stop reason: HOSPADM

## 2023-02-23 RX ORDER — OXYCODONE HYDROCHLORIDE 5 MG/1
5 TABLET ORAL EVERY 4 HOURS PRN
Status: DISCONTINUED | OUTPATIENT
Start: 2023-02-23 | End: 2023-02-23 | Stop reason: HOSPADM

## 2023-02-23 RX ORDER — FENTANYL CITRATE 50 UG/ML
INJECTION, SOLUTION INTRAMUSCULAR; INTRAVENOUS AS NEEDED
Status: DISCONTINUED | OUTPATIENT
Start: 2023-02-23 | End: 2023-02-23

## 2023-02-23 RX ORDER — SODIUM CHLORIDE 9 MG/ML
INJECTION, SOLUTION INTRAVENOUS CONTINUOUS PRN
Status: DISCONTINUED | OUTPATIENT
Start: 2023-02-23 | End: 2023-02-23

## 2023-02-23 RX ADMIN — SODIUM CHLORIDE: 0.9 INJECTION, SOLUTION INTRAVENOUS at 12:02

## 2023-02-23 RX ADMIN — FENTANYL CITRATE 50 MCG: 50 INJECTION, SOLUTION INTRAMUSCULAR; INTRAVENOUS at 15:05

## 2023-02-23 RX ADMIN — PROPOFOL 200 MG: 10 INJECTION, EMULSION INTRAVENOUS at 11:37

## 2023-02-23 RX ADMIN — FENTANYL CITRATE 50 MCG: 50 INJECTION, SOLUTION INTRAMUSCULAR; INTRAVENOUS at 14:45

## 2023-02-23 RX ADMIN — CELECOXIB 200 MG: 200 CAPSULE ORAL at 09:09

## 2023-02-23 RX ADMIN — SODIUM CHLORIDE: 0.9 INJECTION, SOLUTION INTRAVENOUS at 11:31

## 2023-02-23 RX ADMIN — FENTANYL CITRATE 100 MCG: 50 INJECTION, SOLUTION INTRAMUSCULAR; INTRAVENOUS at 12:08

## 2023-02-23 RX ADMIN — FENTANYL CITRATE 50 MCG: 50 INJECTION, SOLUTION INTRAMUSCULAR; INTRAVENOUS at 11:49

## 2023-02-23 RX ADMIN — SUGAMMADEX 200 MG: 100 INJECTION, SOLUTION INTRAVENOUS at 13:45

## 2023-02-23 RX ADMIN — METRONIDAZOLE: 500 INJECTION, SOLUTION INTRAVENOUS at 13:17

## 2023-02-23 RX ADMIN — HYDROMORPHONE HYDROCHLORIDE 0.5 MG: 1 INJECTION, SOLUTION INTRAMUSCULAR; INTRAVENOUS; SUBCUTANEOUS at 14:02

## 2023-02-23 RX ADMIN — GABAPENTIN 100 MG: 100 CAPSULE ORAL at 09:09

## 2023-02-23 RX ADMIN — ONDANSETRON HYDROCHLORIDE 4 MG: 2 SOLUTION INTRAMUSCULAR; INTRAVENOUS at 16:04

## 2023-02-23 RX ADMIN — GLYCOPYRROLATE 0.4 MG: 0.2 INJECTION INTRAMUSCULAR; INTRAVENOUS at 11:59

## 2023-02-23 RX ADMIN — ONDANSETRON HYDROCHLORIDE 4 MG: 2 SOLUTION INTRAMUSCULAR; INTRAVENOUS at 13:45

## 2023-02-23 RX ADMIN — CEFAZOLIN 1000 MG: 1 INJECTION, POWDER, FOR SOLUTION INTRAMUSCULAR; INTRAVENOUS at 11:35

## 2023-02-23 RX ADMIN — ROCURONIUM BROMIDE 50 MG: 10 INJECTION, SOLUTION INTRAVENOUS at 11:37

## 2023-02-23 RX ADMIN — SODIUM CHLORIDE 125 ML/HR: 0.9 INJECTION, SOLUTION INTRAVENOUS at 09:09

## 2023-02-23 RX ADMIN — HYDROMORPHONE HYDROCHLORIDE 0.5 MG: 1 INJECTION, SOLUTION INTRAMUSCULAR; INTRAVENOUS; SUBCUTANEOUS at 12:25

## 2023-02-23 RX ADMIN — HEPARIN SODIUM 5000 UNITS: 5000 INJECTION INTRAVENOUS; SUBCUTANEOUS at 10:14

## 2023-02-23 RX ADMIN — FENTANYL CITRATE 50 MCG: 50 INJECTION, SOLUTION INTRAMUSCULAR; INTRAVENOUS at 14:32

## 2023-02-23 RX ADMIN — DEXAMETHASONE SODIUM PHOSPHATE 10 MG: 10 INJECTION INTRAMUSCULAR; INTRAVENOUS at 11:55

## 2023-02-23 RX ADMIN — ACETAMINOPHEN 325MG 975 MG: 325 TABLET ORAL at 09:09

## 2023-02-23 RX ADMIN — ROCURONIUM BROMIDE 30 MG: 10 INJECTION, SOLUTION INTRAVENOUS at 12:44

## 2023-02-23 RX ADMIN — MIDAZOLAM 2 MG: 1 INJECTION INTRAMUSCULAR; INTRAVENOUS at 11:31

## 2023-02-23 RX ADMIN — FENTANYL CITRATE 50 MCG: 50 INJECTION, SOLUTION INTRAMUSCULAR; INTRAVENOUS at 11:37

## 2023-02-23 RX ADMIN — SODIUM CHLORIDE, SODIUM LACTATE, POTASSIUM CHLORIDE, AND CALCIUM CHLORIDE: .6; .31; .03; .02 INJECTION, SOLUTION INTRAVENOUS at 11:45

## 2023-02-23 RX ADMIN — TRIMETHOBENZAMIDE HYDROCHLORIDE 200 MG: 100 INJECTION INTRAMUSCULAR at 17:00

## 2023-02-23 NOTE — ANESTHESIA POSTPROCEDURE EVALUATION
Post-Op Assessment Note    CV Status:  Stable    Pain management: adequate     Mental Status:  Awake   Hydration Status:  Stable   PONV Controlled:  Controlled   Airway Patency:  Patent      Post Op Vitals Reviewed: Yes      Staff: Anesthesiologist         No notable events documented      /72 (02/23/23 1508)    Temp      Pulse 66 (02/23/23 1508)   Resp 16 (02/23/23 1508)    SpO2 97 % (02/23/23 1508)

## 2023-02-23 NOTE — OP NOTE
OPERATIVE REPORT  PATIENT NAME: Laura Esposito    :  1975  MRN: [id number]  Pt Location: [facility] OR ROOM 07    SURGERY DATE: 2023    Surgeon(s) and Role:     * Desiree Valdez MD - Primary     * Rosaline Rowe PA-C - Assisting     * Barrera Cortez MD - Assisting     * Daysi Lundberg MD - Assisting    Preop Diagnosis:  Abdominal mass, unspecified abdominal location [R19 00]  Pelvic mass [R19 00]    Post-Op Diagnosis Codes:     * Abdominal mass, unspecified abdominal location [R19 00]     * Pelvic mass [R19 00]    Procedure(s):  ROBOT  RESECTION PELVIC MASS  ROBOTIC HYSTERECTOMY  LEFT SALPINGO-OOPHORECTOMY  RIGHT IVANA[PINGECTOMY  INDICATED APPENDECTOMY  CYSTOSCOPY    Specimen(s):  ID Type Source Tests Collected by Time Destination   1 : left ovarian mass(ruptured) Tissue Ovary, Cyst TISSUE EXAM Desiree Valdez MD 2023 1251    2 : Uterus, Cervix, Right Fallopian Tube Tissue Uterus TISSUE Alexandra Buerger, MD 2023 1308    3 :  Tissue Appendix TISSUE EXAM Desiree Valdez MD 2023 1322        Estimated Blood Loss:   Minimal    Drains:  Urethral Catheter Non-latex 16 Fr  (Active)   Number of days: 0       Anesthesia Type:   General    Operative Indications:  Abdominal mass, unspecified abdominal location [R19 00]  22 cm Pelvic mass [R19 00]      Operative Findings:  #1  Approximately 20 to 25 cm large cystic mass replacing the entire left ovary  Simple cystic appearing on imaging and confirmed by gross pathologic assessment  Mass was aspirated for decompression and delivered through the vagina with some spillage of fluid contents and old hemorrhage  No solid  elements to freeze  #2   Grossly normal right fallopian tube and ovary  #3   Approximately 8-week size uterus  #4   Grossly normal appendix  However, given the size of the mass and serum mucinous appearance of contents indicated appendectomy was performed  An appendectomy minimal spillage of fecaliths was noted    This was all washed out  However, given gross contamination antibiotics will be empirically prescribed  #5   Cystoscopy demonstrated normal bladder mucosa and bilateral patent ureteral urine jets  Complications:   None    Procedure and Technique:  The patient was taken to the operating room where general endotracheal anesthesia was induced without complications  The patient received antibiotic prophylaxis per hospital protocol  Sequential compression devices were applied to the lower extremities and activated prior to induction of anesthesia  A single dose of preoperative heparin was administered  The patient was placed in the dorsolithotomy position in [address] [place] [address] and her lower abdomen, perineum and vagina were prepped and draped in the usual sterile fashion  Under direct visualization the uterus was sounded to approximately 9 cm  The endocervix was dilated  A AGATHA uterine manipulator was secured in place with a stitch of 0 Vicryl  Patrick catheter was placed and the intravaginal occluder balloon was inflated  Attention was turned to the abdomen  All port sites were infiltrated with bupivacaine at the beginning of completion of the procedure  An 8 mm skin incision was made approximately 8 cm above the umbilicus at the midline  Then, using a 5 mm Endopath Excel trocar and the 5 mm laparoscope, the peritoneal cavity was entered under directvisualization  Good intraperitoneal location was confirmed and pneumoperitoneum was created to maximal pressure 15 mm of mercury  A total of four 8 mm robotic trocars were placed (2 on the left, 1 in the midline replacing the 5 mm entry port and 1 on the right) and the 5 mm entry trocar was reinserted in the right flank for assistant's use  Using needle laparoscopic cannula the mass was punctured and aspirated to allow pelvic visualization and later delivery of the decompressed ovarian cyst through the vagina    The patient was placed in steep Trendelenburg and the IntroFly system was docked  The above-mentioned findings were noted  The round ligaments were cauterized and divided bilaterally and the bladder was mobilized anteriorly without difficulty  The ureters were clearly identified transperitoneally  The left ovarian vessels were skeletonized,cauterized and divided  The left uterosacral ligament was cauterized and divided and the decompressed mass was temporarily placed in the upper abdomen for later retrieval through the vagina  The right fallopian tube was elevated, and the mesosalpinx was divided and the tube kept in continuity with the uterus for later delivery through the vagina  The right utero-ovarian ligament was cauterized and divided  The uterine vessels were skeletonized cauterized and divided bilaterally  The cardinal ligaments were serially cauterized and divided and a circumferential colpotomy was made  All specimens were removed through the vagina  Given findings and indicated appendectomy was performed  The mesentery of the appendix was divided using the vessel sealer device  Then the base of the appendix was doubly ligated using PDS Endoloops and a distal Endoloop was placed  The appendix was amputated and delivered through the vagina  Unfortunately the second Endoloop ligature fell off releasing scant amount of colonic material   This was all washed out and cleaned without difficulty  Appendiceal stump was therefore suture-ligated with 2-0 strata fix and a pursestring of 2-0 strata fixplaced to imbricate the appendiceal stump within the cecal wall  Pericecal fat was then used to patch the area without difficulty  Of note, the terminal ileum was clearly identified and followed and noted to be away from all areas of dissection  The vaginal cuff was closed using a running stitch of 2-0 PDO Stratafix  Airtight closure an excellent hemostasis was obtained    Copious irrigation was used and excellent hemostasis was confirmed at low intraperitoneal pressures  At this point the robot was undocked  Pneumoperitoneum was completely released with the assistance of Valsalva maneuvers  All trocars were removed and the skin was closed using a subcuticular stitch of 4-0 Monocryl and Exofin was applied to all incisions  The Patrick was removed  Using the [place], the bladder was retrograde filled with approximately 150 mL of NS  We examined the bladder then using the 5 mm laparoscope  The above mentioned findings were noted  The Patrick catheter was then used to drain the bladder and the catheter was then removed  The patient tolerated the procedure well  Sponge, lap, needle and instrument counts were reported as correct x2  At the time of this dictation the patient remained stable in the operating room awaiting extubation         I was present for the entire procedure    Patient Disposition:  PACU     SIGNATURE: Norbert Day MD, Sabrina Alba  DATE: February 23, 2023  TIME: 1:52 PM

## 2023-02-23 NOTE — DISCHARGE INSTRUCTIONS
[facility]  Kelby Robison, Yelena Arnold  [phone]    Hysterectomy Discharge Instructions    WHAT YOU NEED TO KNOW:   A hysterectomy is surgery to remove your uterus  Your ovaries, fallopian tubes, cervix, or part of your vagina may also need to be removed  The organs and tissue that will be removed depends on your medical condition  After a hysterectomy, you will not be able to become pregnant  If your ovaries are removed, you will go through menopause if you have not already  DISCHARGE INSTRUCTIONS:   Contact your doctor at the number above if:   You have a fever over 101o  You have nausea or are vomiting that does not improve after a light meal    Your pain is getting worse, even after you take medicine  You feel pain or burning when you urinate, or you have trouble urinating  You have pus or a foul-smelling odor coming from your vagina  Your wound is red, swollen, or draining pus  You see new or an increased amount of bright red blood coming from your vagina or your incisions  You have questions or concerns about your condition or care  Seek care immediately:   Your arm or leg feels warm, tender, and painful  It may look swollen and red  You have increasing abdominal or pelvic pain  You have heavy vaginal bleeding that fills 1 or more sanitary pads in 1 hour  Call 911 for any of the following: You feel lightheaded, short of breath, and have chest pain  You cough up blood  Medicines: You may need any of the following:  Prescription medicine may be given  You may receive a prescription for pain medication or be advised to use over the counter (OTC) pain medication such as acetaminophen (Tylenol) or ibuprofen (Advil, Motrin)  Ask your healthcare provider how to take this medicine safely  NSAIDs , such as ibuprofen, help decrease swelling, pain, and fever   NSAIDs can cause stomach bleeding or kidney problems in certain people  If you take blood thinner medicine, always ask your healthcare provider if NSAIDs are safe for you  Always read the medicine label and follow directions  Stool softeners help treat or prevent constipation  Take your medicine as directed  Contact your healthcare provider if you think your medicine is not helping or if you have side effects  Tell him or her if you are allergic to any medicine  Keep a list of the medicines, vitamins, and herbs you take  Include the amounts, and when and why you take them  Bring the list or the pill bottles to follow-up visits  Carry your medicine list with you in case of an emergency  Activity:   Rest as needed  Get up and move around as directed to help prevent blood clots  Start with short walks and slowly increase the distance every day  Limit the number of times you climb stairs to 2 times each day for the first week  Plan most of your daily activities on one level of your home  Do not lift objects heavier than 10 pounds for 6 weeks  Avoid strenuous activity for 2 weeks  Do not strain during bowel movements  High-fiber foods and extra liquids can help you prevent constipation  Examples of high-fiber foods are fruit and bran  Prune juice and water are good liquids to drink  Do not have sex, use tampons, or douche for up to 8 weeks  You may shower as soon as the day after surgery  Tub baths can be taken starting 2 weeks after surgery  Do not go into pools or hot tubs until cleared by your doctor  Ask when it is safe for you to drive  It is generally safe to drive after 2 weeks and when no longer taking prescription pain medication  Ask when you may return to work and to other regular activities  Wound care: Care for your abdominal incisions as directed  Carefully wash around the wound with soap and water   If you have Hibiclens or medicated soap that you were instructed to use before surgery, you may use that to wash with for up to 2 days after surgery  If not, any mild non-scented, non-abrasive soap is safe  It is okay to let the soap and water run over your incision  Do not scrub your incision  Dry the area and put on new, clean bandages as directed  Change your bandages when they get wet or dirty  If you have strips of medical tape, let them fall off on their own  It may take 7 to 14 days for them to fall off  Check your incision every day for redness, swelling, or pus  Deep breathing: Take deep breaths and cough 10 times each hour  This will decrease your risk for a lung infection  Take a deep breath and hold it for as long as you can  Let the air out and then cough strongly  Deep breaths help open your airway  You may be given an incentive spirometer to help you take deep breaths  Put the plastic piece in your mouth and take a slow, deep breath, then let the air out and cough  Repeat these steps 10 times every hour  Get support: This surgery may be life-changing for you and your family  You will no longer be able to get pregnant  Sudden changes in the levels of your hormones may occur and cause mood swings and depression  You may feel angry, sad, or frightened, or cry frequently and unexpectedly  These feelings are normal  Talk to your healthcare provider about where you can get support  You can also ask if hormone replacement medicine is right for you  Follow up with your healthcare provider or gynecologist as directed: You may need to return to have stitches removed, and for other tests  Write down your questions so you remember to ask them during your visits  © 2017 [employer] Information is for End User's use only and may not be sold, redistributed or otherwise used for commercial purposes  All illustrations and images included in CareNotes® are the copyrighted property of [employer]  or [employer]  The above information is an  only   It is not intended as medical advice for individual conditions or treatments  Talk to your doctor, nurse or pharmacist before following any medical regimen to see if it is safe and effective for you

## 2023-02-23 NOTE — DISCHARGE INSTR - AVS FIRST PAGE
Robotic hysterectomy, left salpingo-oophorectomy and resection of large pelvic mass, right salpingectomy, indicated appendectomy and cystoscopy  Discharge Instructions    WHAT YOU NEED TO KNOW:   Today we removed your large left mass/tube and ovary, right fallopian tube, uterus and cervix  The left cyst/mass was benign  Because of the type of benign tumor on appendectomy was performed as those tumors can sometimes be associated with benign behaving tumors of the appendix  During the appendectomy and minimal amount of stool was seen and completely washed out but antibiotics will be prescribed to you for 7 days to prevent infection  At the completion of the procedure we looked inside the bladder and there was no evidence of injuries or abnormalities  Your procedures were uncomplicated  DISCHARGE INSTRUCTIONS:   Contact your doctor at the number above if:   You have a fever over 101o  You have nausea or are vomiting that does not improve after a light meal    Your pain is getting worse, even after you take medicine  You feel pain or burning when you urinate, or you have trouble urinating  You have pus or a foul-smelling odor coming from your vagina and/or wound  Your wound is red, swollen, or draining pus  You see new or an increased amount of bright red blood coming from your vagina or your incisions  You have questions or concerns about your condition or care  Seek care immediately:   Your arm or leg feels warm, tender, and painful  It may look swollen and red  You have increasing abdominal or pelvic pain  You have heavy vaginal bleeding that fills 1 or more sanitary pads in 1 hour  Call 911 for any of the following: You feel lightheaded, short of breath, and have chest pain  You cough up blood  Medicines: You may need any of the following:  Prescription medicine: Augmentin 875/125 take 1 tablet by mouth with breakfast and dinner for 7 days to prevent infection    Apply ice packs to the incisions for the first 24 hours to prevent discomfort and pain  Oxycodone 5 mg tablets: Use only for severe pain and for the shortest possible duration  This medication will not be refilled  Take 1 tablet by mouth every 4-6 hours for severe pain only  This medication is highly addictive should be avoided if at all possible  Resume your previous medications as directed  Extra-strength Tylenol: This medications over-the-counter  Take 2 tablets every 6 hours as needed for mild-to-moderate pain  Ibuprofen/Advil/Motrin 200 mg tablets: This medication is over-the-counter  Take 3 tablets every 8 hours as needed for moderate to severe pain  Take this medication with food  The drink plenty of fluids while using this medication to prevent kidney injury  Metamucil or MiraLax: This product is over-the-counter  Distal 1 large tbsp in a large glass of water  Use once or twice a day for 1-2 weeks prevent and or treat constipation  Take your medicines as directed  Contact your healthcare provider if you think your medicine is not helping or if you have side effects  Tell him or her if you are allergic to any medicine  Keep a list of the medicines, vitamins, and herbs you take  Include the amounts, and when and why you take them  Bring the list or the pill bottles to follow-up visits  Carry your medicine list with you in case of an emergency  Activity:   Rest as needed  Get up and move around as directed to help prevent blood clots  Start with short walks and slowly increase the distance every day  Limit the number of times you climb stairs to 2 times each day for the first week  Plan most of your daily activities on one level of your home  Do not lift objects heavier than 10 pounds until seen in the office for your follow-up appointment  Do not strain during bowel movements  High-fiber foods and extra liquids can help you prevent constipation  Examples of high-fiber foods are fruit and bran  Prune juice and water are good liquids to drink  Do not have sex, use tampons, or douche and do not do tub baths/swimming until cleared by your physician at your postoperative appointment  You may shower as soon as the day after surgery  Do not go into pools or hot tubs until cleared by your doctor  Ask when it is safe for you to drive  It is generally safe to drive as soon as your legs are strong, you are off pain medicines and you feel like you will have the appropriate reflexes to step on the breaks in case of an emergency  Ask when you may return to work and to other regular activities  Wound care: Care for your abdominal incisions as directed  Carefully wash around the wound with soap and water  If you have Hibiclens or medicated soap that you were instructed to use before surgery, you may use that to wash with for up to 2 days after surgery  If not, any mild non-scented, non-abrasive soap is safe  It is okay to let the soap and water run over your incision  Do not scrub your incision  Dry the area and put on new, clean bandages as directed  Change your bandages when they get wet or dirty  If you have strips of medical tape, let them fall off on their own  It may take 7 to 14 days for them to fall off  Check your incision every day for redness, swelling, or pus  Deep breathing: Take deep breaths and cough 10 times each hour  This will decrease your risk for a lung infection  Take a deep breath and hold it for as long as you can  Let the air out and then cough strongly  Deep breaths help open your airway  You may be given an incentive spirometer to help you take deep breaths  Put the plastic piece in your mouth and take a slow, deep breath, then let the air out and cough  Repeat these steps 10 times every hour  Get support: This surgery may be life-changing for you and your family  You will no longer be able to get pregnant   Sudden changes in the levels of your hormones may occur and cause mood swings and depression  You may feel angry, sad, or frightened, or cry frequently and unexpectedly  These feelings are normal  Talk to your healthcare provider about where you can get support  You can also ask if hormone replacement medicine is right for you  Follow up with your healthcare provider or gynecologist as directed: You may need to return to have stitches removed, and for other tests  Write down your questions so you remember to ask them during your visits  © 2017 [employer] Information is for End User's use only and may not be sold, redistributed or otherwise used for commercial purposes  All illustrations and images included in CareNotes® are the copyrighted property of [employer]  or [employer]  The above information is an  only  It is not intended as medical advice for individual conditions or treatments  Talk to your doctor, nurse or pharmacist before following any medical regimen to see if it is safe and effective for you

## 2023-02-23 NOTE — ANESTHESIA PREPROCEDURE EVALUATION
Procedure:  PSB ROBOT VS OPEN RESECTION PELVIC MASS (Abdomen)  PSB HYSTERECTOMY, BSO, (Abdomen)  EX LAP W/ROBOT (Abdomen)    Relevant Problems   NEURO/PSYCH   (+) Depression   (+) Epilepsy (HCC)        Physical Exam    Airway    Mallampati score: II  TM Distance: >3 FB  Neck ROM: full     Dental   No notable dental hx     Cardiovascular  Rhythm: regular, Rate: normal, Cardiovascular exam normal    Pulmonary  Pulmonary exam normal Breath sounds clear to auscultation,     Other Findings        Anesthesia Plan  ASA Score- 2     Anesthesia Type- general with ASA Monitors  Additional Monitors:   Airway Plan: ETT  Comment: Discussed TAPS if open  Plan Factors-Exercise tolerance (METS): >4 METS  Chart reviewed  Existing labs reviewed  Patient summary reviewed  There is medical exclusion for perioperative obstructive sleep apnea risk education  Induction- intravenous  Postoperative Plan-     Informed Consent- Anesthetic plan and risks discussed with patient and spouse

## 2023-02-23 NOTE — ADDENDUM NOTE
Addendum  created 02/23/23 2201 by Marcelino Miles MD    Order list changed, Pharmacy for encounter modified

## 2023-02-23 NOTE — INTERVAL H&P NOTE
H&P reviewed  After examining the patient I find no changes in the patients condition since the H&P had been written      Vitals:    02/23/23 0844   BP: 107/58   Pulse: 70   Resp: 16   Temp: 97 9 °F (36 6 °C)   SpO2: 96%

## 2023-03-03 ENCOUNTER — DOCUMENTATION (OUTPATIENT)
Dept: HEMATOLOGY ONCOLOGY | Facility: CLINIC | Age: 48
End: 2023-03-03

## 2023-03-03 NOTE — PROGRESS NOTES
In-basket message received from Dr Yolanda Rosario to add patient to GYN Oncology tumor board on 3/6/2023  Chart reviewed and tumor board prep completed

## 2023-03-07 ENCOUNTER — DOCUMENTATION (OUTPATIENT)
Dept: GYNECOLOGIC ONCOLOGY | Facility: CLINIC | Age: 48
End: 2023-03-07

## 2023-03-07 NOTE — PROGRESS NOTES
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, stage I C I, grade 2     Patient was discussed at the Multidisciplinary Gynecologic Oncology Case review on 3/6/2023  The group recommended to consider routine observation versus  removal of remaining tube an ovary and monitoring inhibin levels  Follow-up appointment with Dr Brittney Robles on 3/21/2023     NCCN guidelines were available for review  The final treatment plan will be left to the discretion of the patient and the treating physician  DISCLAIMERS:    TO THE TREATING PHYSICIAN:  This conference is a meeting of clinicians from various specialty areas who evaluate and discuss patients for whom a multidisciplinary treatment approach is being considered  Please note that the above opinion was a consensus of the conference attendees and is intended only to assist in quality care of the discussed patient  The responsibility for follow up on the input given during the conference, along with any final decisions regarding plan of care, is that of the patient and the patient's provider  Accordingly, appointments have only been recommended based on this information and have NOT been scheduled unless otherwise noted  TO THE PATIENT:  This summary is a brief record of major aspects of your cancer treatment  You may choose to share a copy with any of your doctors or nurses  However, this is not a detailed or comprehensive record of your care

## 2023-03-21 ENCOUNTER — HOSPITAL ENCOUNTER (OUTPATIENT)
Dept: ULTRASOUND IMAGING | Facility: CLINIC | Age: 48
Discharge: HOME | End: 2023-03-21

## 2023-03-21 ENCOUNTER — HOSPITAL ENCOUNTER (OUTPATIENT)
Dept: MAMMOGRAPHY | Facility: CLINIC | Age: 48
Discharge: HOME | End: 2023-03-21

## 2023-03-21 ENCOUNTER — OFFICE VISIT (OUTPATIENT)
Dept: GYNECOLOGIC ONCOLOGY | Facility: CLINIC | Age: 48
End: 2023-03-21

## 2023-03-21 VITALS
HEART RATE: 86 BPM | WEIGHT: 149 LBS | HEIGHT: 63 IN | BODY MASS INDEX: 26.4 KG/M2 | DIASTOLIC BLOOD PRESSURE: 66 MMHG | OXYGEN SATURATION: 98 % | SYSTOLIC BLOOD PRESSURE: 132 MMHG

## 2023-03-21 VITALS — WEIGHT: 161 LBS | BODY MASS INDEX: 28.53 KG/M2 | HEIGHT: 63 IN

## 2023-03-21 DIAGNOSIS — R92.8 ABNORMAL MAMMOGRAM: ICD-10-CM

## 2023-03-21 DIAGNOSIS — D39.12 GRANULOSA CELL TUMOR OF LEFT OVARY: Primary | ICD-10-CM

## 2023-03-21 PROBLEM — R19.00 PELVIC MASS: Status: RESOLVED | Noted: 2023-02-13 | Resolved: 2023-03-21

## 2023-03-21 RX ORDER — CELECOXIB 200 MG/1
200 CAPSULE ORAL ONCE
OUTPATIENT
Start: 2023-03-21 | End: 2023-03-21

## 2023-03-21 RX ORDER — CEFAZOLIN SODIUM 1 G/50ML
1000 SOLUTION INTRAVENOUS ONCE
OUTPATIENT
Start: 2023-03-21 | End: 2023-03-21

## 2023-03-21 RX ORDER — HEPARIN SODIUM 5000 [USP'U]/ML
5000 INJECTION, SOLUTION INTRAVENOUS; SUBCUTANEOUS
OUTPATIENT
Start: 2023-03-22 | End: 2023-03-23

## 2023-03-21 RX ORDER — ACETAMINOPHEN 325 MG/1
975 TABLET ORAL ONCE
OUTPATIENT
Start: 2023-03-21 | End: 2023-03-21

## 2023-03-21 NOTE — ASSESSMENT & PLAN NOTE
Incidentally found as a small focus on her right large benign ovarian cyst   She is now status post hysterectomy, right salpingo-oophorectomy and left salpingectomy  Left ovary in situ  Tumor board recommended consideration of surgical removal of remaining ovary to follow inhibin levels  Today I counseled patient and she consents to proceed with laparoscopic right oophorectomy, peritoneal biopsies, omentectomy and all other indicated procedures  Patient is young and healthy no additional cardiovascular work-up is indicated  We will obtain preoperative testing as per procedure pass and we will also obtain baseline inhibin level  Preoperative instructions as per ERAS  I discussed with patient indication, risks, benefits and alternatives of surgical exploration  We discussed possibility of bleeding requiring blood transfusion, life-threatening infections requiring additional procedures, injuries to surrounding organs such as bladder, ureters, gastrointestinal tract and or neurovascular structures  Additionally, we discussed general risks associated to stress of surgery such as venous thromboembolism, acute myocardial events and stroke  All questions answered to patient's satisfaction  Patient consents for blood transfusions if those are deemed necessary during the course of care  She understands risks include but are not limited to hypersensitivity reactions and infection with blood-borne pathogens  She agrees and wants to proceed  Informed consent form signed

## 2023-03-21 NOTE — PATIENT INSTRUCTIONS
Instructions as preoperative packet  Refrain from sexual intercourse for 2-3 additional weeks  Call if you have any questions

## 2023-03-21 NOTE — PROGRESS NOTES
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     Incidentally found as a small focus on her right large benign ovarian cyst   She is now status post hysterectomy, right salpingo-oophorectomy and left salpingectomy  Left ovary in situ  Tumor board recommended consideration of surgical removal of remaining ovary to follow inhibin levels  Today I counseled patient and she consents to proceed with laparoscopic right oophorectomy, peritoneal biopsies, omentectomy and all other indicated procedures  Patient is young and healthy no additional cardiovascular work-up is indicated  We will obtain preoperative testing as per procedure pass and we will also obtain baseline inhibin level  Preoperative instructions as per ERAS  I discussed with patient indication, risks, benefits and alternatives of surgical exploration  We discussed possibility of bleeding requiring blood transfusion, life-threatening infections requiring additional procedures, injuries to surrounding organs such as bladder, ureters, gastrointestinal tract and or neurovascular structures  Additionally, we discussed general risks associated to stress of surgery such as venous thromboembolism, acute myocardial events and stroke  All questions answered to patient's satisfaction  Patient consents for blood transfusions if those are deemed necessary during the course of care  She understands risks include but are not limited to hypersensitivity reactions and infection with blood-borne pathogens  She agrees and wants to proceed  Informed consent form signed              Relevant Orders    Case request operating room: Laparoscopic right oophorectomy, peritoneal biopsies, omentectomy and all other indicated procedures (Completed)    Inhibin B    Type and screen           CHIEF COMPLAINT:   Postoperative follow-up, counseled regarding treatment for incidental granulosa cell tumor, preoperative visit    Patient ID: Gagandeep Dye is a 52 y o  female  HPI  22-year-old now approximately 4 weeks out after robotic hysterectomy, resection of large left ovarian mass, left salpingo-oophorectomy, right salpingectomy for large ovarian cyst   Final pathology demonstrated benign cystic neoplasm with focus of granulosa cell tumor  Patient has recovered uneventfully from surgery  Denies vaginal bleeding, drainage or discharge  Normal bowel and bladder function  Incisions well-healed  No complaints  Review of Systems  As per HPI  Review of systems otherwise unremarkable  Current Outpatient Medications   Medication Sig Dispense Refill   • CALCIUM PO Take by mouth     • Dihydrotachysterol (DHT PO) Take by mouth     • lamoTRIgine (LaMICtal) 100 mg tablet      • multivitamin (THERAGRAN) TABS Take 1 tablet by mouth daily     • VITAMIN D PO Take by mouth       No current facility-administered medications for this visit  Allergies   Allergen Reactions   • Latex Hives, Itching and Rash   • Medical Tape Hives, Itching and Rash   • Shellfish Allergy - Food Allergy Hives, Itching, Rash, Swelling and Vomiting       Past Medical History:   Diagnosis Date   • Depression     Lamictal to control bipolar   • Epilepsy ([address] )     Teenage into adulthood  No seizures in 19 years   • Miscarriage     Elective  @ 12years old  Miscarriage between 1st and 2nd child   • Seizures ([address] ) 1989    Episode free for over 10 years now (maybe 21? )   • Varicella     Childhood, 3rd grade or so   Very mild case       Past Surgical History:   Procedure Laterality Date   • CYSTOSCOPY N/A 2023    Procedure: CYSTOSCOPY;  Surgeon: Mil Tam MD;  [facility];  Service: Gynecology Oncology   • HYSTERECTOMY N/A 2023    Procedure: ROBOTIC HYSTERECTOMY, LEFT SALPINGO-OOPHORECTOMY, RIGHT SAL[PINGECTOMY, INDICATED APPENDECTOMY;  Surgeon: Mil Tam MD;  [facility];  Service: Gynecology Oncology   • SD LAPS FULG/EXC OVARY VISCERA/PERITONEAL SURFACE N/A 2023    Procedure: ROBOT  RESECTION PELVIC MASS;  Surgeon: Grover Stanley MD;  [facility];  Service: Gynecology Oncology   • TUBAL LIGATION         OB History        4    Para   2    Term   2            AB   1    Living   2       SAB   1    IAB        Ectopic        Multiple        Live Births   2                 Family History   Problem Relation Age of Onset   • No Known Problems Mother    • No Known Problems Father    • No Known Problems Daughter    • No Known Problems Maternal Grandmother    • No Known Problems Maternal Grandfather    • No Known Problems Paternal Grandmother    • No Known Problems Paternal Grandfather    • No Known Problems Maternal Aunt    • No Known Problems Maternal Aunt    • No Known Problems Maternal Aunt    • No Known Problems Maternal Aunt    • No Known Problems Paternal Aunt    • No Known Problems Paternal Aunt        The following portions of the patient's history were reviewed and updated as appropriate: allergies, current medications, past family history, past medical history, past social history, past surgical history and problem list       Objective:    Blood pressure 132/66, pulse 86, height 5' 2 5" (1 588 m), weight 67 6 kg (149 lb), last menstrual period 2022, SpO2 98 %  Body mass index is 26 82 kg/m²  Physical Exam  Vitals reviewed  Exam conducted with a chaperone present  Constitutional:       General: She is not in acute distress  Appearance: Normal appearance  She is not ill-appearing or toxic-appearing  Cardiovascular:      Rate and Rhythm: Normal rate and regular rhythm  Heart sounds: Normal heart sounds  No murmur heard  Pulmonary:      Effort: Pulmonary effort is normal  No respiratory distress  Breath sounds: Normal breath sounds  No stridor  No wheezing  Abdominal:      General: There is no distension        Palpations: Abdomen is soft       Tenderness: There is no abdominal tenderness  There is no guarding  Hernia: No hernia is present  Comments: Incisions well-healed  Genitourinary:     Comments: Normal external female genitalia  Normal Bartholin's and [place]'s glands  Normal urethral meatus and no evidence of urethral discharge or masses  Bladder without fullness mass or tenderness  Vaginal cuff well-healed, scant whitish discharge, suture material remains  No significant pelvic organ prolapse noted  Cervix and uterus are surgically absent  Bimanual exam demonstrates no evidence of pelvic masses  Anus without fissure of lesion  Musculoskeletal:      Right lower leg: No edema  Left lower leg: No edema         Mertha Babinski, MD, [address]  3/21/2023  3:23 PM

## 2023-03-27 ENCOUNTER — TELEPHONE (OUTPATIENT)
Dept: GYNECOLOGIC ONCOLOGY | Facility: CLINIC | Age: 48
End: 2023-03-27

## 2023-03-27 NOTE — TELEPHONE ENCOUNTER
Patient returned called  She will not have insurance/benefits until after 7/1/23   She will need to wait until then to set up surgery

## 2023-04-10 NOTE — TELEPHONE ENCOUNTER
Spoke to pt and reviewed results and recommendations from their labs per  [facility]  Not menopausal  No period since 45  No

## 2023-06-13 ENCOUNTER — TELEPHONE (OUTPATIENT)
Dept: GYNECOLOGIC ONCOLOGY | Facility: CLINIC | Age: 48
End: 2023-06-13

## 2023-06-13 NOTE — TELEPHONE ENCOUNTER
Left message for patient that her insurance has   Asked for new insurance so that we can process the authorization for her surgery

## 2023-06-29 ENCOUNTER — TELEPHONE (OUTPATIENT)
Dept: GYNECOLOGIC ONCOLOGY | Facility: CLINIC | Age: 48
End: 2023-06-29

## 2023-07-07 ENCOUNTER — TELEPHONE (OUTPATIENT)
Dept: GYNECOLOGIC ONCOLOGY | Facility: CLINIC | Age: 48
End: 2023-07-07

## 2023-07-07 ENCOUNTER — TELEPHONE (OUTPATIENT)
Dept: HEMATOLOGY ONCOLOGY | Facility: CLINIC | Age: 48
End: 2023-07-07

## 2023-07-07 NOTE — TELEPHONE ENCOUNTER
Patient Call    Who are you speaking with? Patient    If it is not the patient, are they listed on an active communication consent form? N/A   What is the reason for this call? Patient is calling to see if she can reschedule her surgery date for 7/13/23   Does this require a call back? Yes   If a call back is required, please list best call back number [phone]   If a call back is required, advise that a message will be forwarded to their care team and someone will return their call as soon as possible. Did you relay this information to the patient?  Yes

## 2023-07-07 NOTE — TELEPHONE ENCOUNTER
Return call placed to patient.  Left message that we can reschedule if she'd like, but that the provider is booked now into August.

## 2023-07-07 NOTE — TELEPHONE ENCOUNTER
Attempted to call patient after failed transfer attempt when she called in. Left a voicemail that I have been trying to get in touch.

## 2023-07-07 NOTE — TELEPHONE ENCOUNTER
Call Transfer   Who are you speaking with? Patient   If it is not the patient, are they listed on an active communication consent form? N/A   Who is the patients HemOnc/SurgOnc provider? Dr. Levar Bee   What is the reason for this call? Returning [facility] Call    Person/Department that the call was transferred to? Time that call was transferred? Syeda 2/19pm   Your call will be transferred now. If you receive a voicemail, please leave a detailed message and a member of the team will return your call as soon as possible. Did you relay this information to the caller?   Yes

## 2023-07-10 ENCOUNTER — TELEPHONE (OUTPATIENT)
Dept: GYNECOLOGIC ONCOLOGY | Facility: CLINIC | Age: 48
End: 2023-07-10

## 2023-07-20 ENCOUNTER — TELEPHONE (OUTPATIENT)
Dept: HEMATOLOGY ONCOLOGY | Facility: CLINIC | Age: 48
End: 2023-07-20

## 2023-07-20 NOTE — TELEPHONE ENCOUNTER
Patient Call    Who are you speaking with? [employer]    If it is not the patient, are they listed on an active communication consent form? N/A   What is the reason for this call? CPT code 1305 Jeevan Homer is not required for in network providers in outpatient setting    Does this require a call back? N/A   If a call back is required, please list best call back number n/a   If a call back is required, advise that a message will be forwarded to their care team and someone will return their call as soon as possible. Did you relay this information to the patient?  N/A

## 2023-08-07 ENCOUNTER — ANESTHESIA EVENT (OUTPATIENT)
Dept: PERIOP | Facility: HOSPITAL | Age: 48
End: 2023-08-07
Payer: COMMERCIAL

## 2023-08-09 ENCOUNTER — LAB REQUISITION (OUTPATIENT)
Dept: LAB | Facility: HOSPITAL | Age: 48
End: 2023-08-09
Payer: COMMERCIAL

## 2023-08-09 ENCOUNTER — APPOINTMENT (OUTPATIENT)
Dept: LAB | Facility: CLINIC | Age: 48
End: 2023-08-09
Payer: COMMERCIAL

## 2023-08-09 DIAGNOSIS — D39.12 GRANULOSA CELL TUMOR OF LEFT OVARY: ICD-10-CM

## 2023-08-09 DIAGNOSIS — D39.12 NEOPLASM OF UNCERTAIN BEHAVIOR OF LEFT OVARY: ICD-10-CM

## 2023-08-09 PROCEDURE — 86900 BLOOD TYPING SEROLOGIC ABO: CPT | Performed by: OBSTETRICS & GYNECOLOGY

## 2023-08-09 PROCEDURE — 83520 IMMUNOASSAY QUANT NOS NONAB: CPT

## 2023-08-09 PROCEDURE — 86850 RBC ANTIBODY SCREEN: CPT | Performed by: OBSTETRICS & GYNECOLOGY

## 2023-08-09 PROCEDURE — 86901 BLOOD TYPING SEROLOGIC RH(D): CPT | Performed by: OBSTETRICS & GYNECOLOGY

## 2023-08-09 PROCEDURE — 36415 COLL VENOUS BLD VENIPUNCTURE: CPT

## 2023-08-10 ENCOUNTER — HOSPITAL ENCOUNTER (OUTPATIENT)
Facility: HOSPITAL | Age: 48
Setting detail: OUTPATIENT SURGERY
Discharge: HOME | End: 2023-08-10
Attending: OBSTETRICS & GYNECOLOGY | Admitting: OBSTETRICS & GYNECOLOGY
Payer: COMMERCIAL

## 2023-08-10 ENCOUNTER — ANESTHESIA (OUTPATIENT)
Dept: PERIOP | Facility: HOSPITAL | Age: 48
End: 2023-08-10
Payer: COMMERCIAL

## 2023-08-10 VITALS
BODY MASS INDEX: 27.03 KG/M2 | WEIGHT: 152.56 LBS | HEART RATE: 67 BPM | HEIGHT: 63 IN | TEMPERATURE: 97.8 F | RESPIRATION RATE: 12 BRPM | SYSTOLIC BLOOD PRESSURE: 128 MMHG | DIASTOLIC BLOOD PRESSURE: 73 MMHG | OXYGEN SATURATION: 97 %

## 2023-08-10 DIAGNOSIS — D39.12 GRANULOSA CELL TUMOR OF LEFT OVARY: Primary | ICD-10-CM

## 2023-08-10 PROBLEM — F17.200 SMOKING: Status: ACTIVE | Noted: 2023-08-10

## 2023-08-10 PROCEDURE — 88307 TISSUE EXAM BY PATHOLOGIST: CPT | Performed by: PATHOLOGY

## 2023-08-10 PROCEDURE — 88342 IMHCHEM/IMCYTCHM 1ST ANTB: CPT | Performed by: PATHOLOGY

## 2023-08-10 PROCEDURE — 58661 LAPAROSCOPY REMOVE ADNEXA: CPT | Performed by: OBSTETRICS & GYNECOLOGY

## 2023-08-10 PROCEDURE — 88305 TISSUE EXAM BY PATHOLOGIST: CPT | Performed by: PATHOLOGY

## 2023-08-10 PROCEDURE — NC001 PR NO CHARGE: Performed by: OBSTETRICS & GYNECOLOGY

## 2023-08-10 RX ORDER — KETOROLAC TROMETHAMINE 30 MG/ML
INJECTION, SOLUTION INTRAMUSCULAR; INTRAVENOUS AS NEEDED
Status: DISCONTINUED | OUTPATIENT
Start: 2023-08-10 | End: 2023-08-10

## 2023-08-10 RX ORDER — LIDOCAINE HYDROCHLORIDE 20 MG/ML
INJECTION, SOLUTION EPIDURAL; INFILTRATION; INTRACAUDAL; PERINEURAL AS NEEDED
Status: DISCONTINUED | OUTPATIENT
Start: 2023-08-10 | End: 2023-08-10

## 2023-08-10 RX ORDER — ONDANSETRON 2 MG/ML
4 INJECTION INTRAMUSCULAR; INTRAVENOUS EVERY 6 HOURS PRN
Status: DISCONTINUED | OUTPATIENT
Start: 2023-08-10 | End: 2023-08-10 | Stop reason: HOSPADM

## 2023-08-10 RX ORDER — BUPIVACAINE HYDROCHLORIDE 2.5 MG/ML
INJECTION, SOLUTION EPIDURAL; INFILTRATION; INTRACAUDAL AS NEEDED
Status: DISCONTINUED | OUTPATIENT
Start: 2023-08-10 | End: 2023-08-10 | Stop reason: HOSPADM

## 2023-08-10 RX ORDER — HEPARIN SODIUM 5000 [USP'U]/ML
5000 INJECTION, SOLUTION INTRAVENOUS; SUBCUTANEOUS
Status: COMPLETED | OUTPATIENT
Start: 2023-08-10 | End: 2023-08-10

## 2023-08-10 RX ORDER — KETAMINE HYDROCHLORIDE 100 MG/ML
INJECTION INTRAMUSCULAR; INTRAVENOUS AS NEEDED
Status: DISCONTINUED | OUTPATIENT
Start: 2023-08-10 | End: 2023-08-10

## 2023-08-10 RX ORDER — ONDANSETRON 2 MG/ML
INJECTION INTRAMUSCULAR; INTRAVENOUS AS NEEDED
Status: DISCONTINUED | OUTPATIENT
Start: 2023-08-10 | End: 2023-08-10

## 2023-08-10 RX ORDER — MIDAZOLAM HYDROCHLORIDE 2 MG/2ML
INJECTION, SOLUTION INTRAMUSCULAR; INTRAVENOUS AS NEEDED
Status: DISCONTINUED | OUTPATIENT
Start: 2023-08-10 | End: 2023-08-10

## 2023-08-10 RX ORDER — SODIUM CHLORIDE 9 MG/ML
125 INJECTION, SOLUTION INTRAVENOUS CONTINUOUS
Status: DISCONTINUED | OUTPATIENT
Start: 2023-08-10 | End: 2023-08-10

## 2023-08-10 RX ORDER — CELECOXIB 200 MG/1
200 CAPSULE ORAL ONCE
Status: COMPLETED | OUTPATIENT
Start: 2023-08-10 | End: 2023-08-10

## 2023-08-10 RX ORDER — DEXAMETHASONE SODIUM PHOSPHATE 10 MG/ML
INJECTION, SOLUTION INTRAMUSCULAR; INTRAVENOUS AS NEEDED
Status: DISCONTINUED | OUTPATIENT
Start: 2023-08-10 | End: 2023-08-10

## 2023-08-10 RX ORDER — IBUPROFEN 600 MG/1
600 TABLET ORAL EVERY 6 HOURS PRN
Qty: 90 TABLET | Refills: 0 | Status: SHIPPED | OUTPATIENT
Start: 2023-08-10

## 2023-08-10 RX ORDER — FENTANYL CITRATE/PF 50 MCG/ML
25 SYRINGE (ML) INJECTION
Status: DISCONTINUED | OUTPATIENT
Start: 2023-08-10 | End: 2023-08-10 | Stop reason: HOSPADM

## 2023-08-10 RX ORDER — SODIUM CHLORIDE, SODIUM LACTATE, POTASSIUM CHLORIDE, CALCIUM CHLORIDE 600; 310; 30; 20 MG/100ML; MG/100ML; MG/100ML; MG/100ML
INJECTION, SOLUTION INTRAVENOUS CONTINUOUS PRN
Status: DISCONTINUED | OUTPATIENT
Start: 2023-08-10 | End: 2023-08-10

## 2023-08-10 RX ORDER — FENTANYL CITRATE 50 UG/ML
INJECTION, SOLUTION INTRAMUSCULAR; INTRAVENOUS AS NEEDED
Status: DISCONTINUED | OUTPATIENT
Start: 2023-08-10 | End: 2023-08-10

## 2023-08-10 RX ORDER — CEFAZOLIN SODIUM 1 G/50ML
1000 SOLUTION INTRAVENOUS ONCE
Status: COMPLETED | OUTPATIENT
Start: 2023-08-10 | End: 2023-08-10

## 2023-08-10 RX ORDER — PROPOFOL 10 MG/ML
INJECTION, EMULSION INTRAVENOUS CONTINUOUS PRN
Status: DISCONTINUED | OUTPATIENT
Start: 2023-08-10 | End: 2023-08-10

## 2023-08-10 RX ORDER — PROPOFOL 10 MG/ML
INJECTION, EMULSION INTRAVENOUS AS NEEDED
Status: DISCONTINUED | OUTPATIENT
Start: 2023-08-10 | End: 2023-08-10

## 2023-08-10 RX ORDER — OXYCODONE HYDROCHLORIDE 5 MG/1
5 TABLET ORAL EVERY 4 HOURS PRN
Status: DISCONTINUED | OUTPATIENT
Start: 2023-08-10 | End: 2023-08-10 | Stop reason: HOSPADM

## 2023-08-10 RX ORDER — ACETAMINOPHEN 325 MG/1
975 TABLET ORAL EVERY 6 HOURS PRN
Status: DISCONTINUED | OUTPATIENT
Start: 2023-08-10 | End: 2023-08-10 | Stop reason: HOSPADM

## 2023-08-10 RX ORDER — ACETAMINOPHEN 325 MG/1
650 TABLET ORAL EVERY 6 HOURS
Qty: 60 TABLET | Refills: 0 | Status: SHIPPED | OUTPATIENT
Start: 2023-08-10

## 2023-08-10 RX ORDER — HEPARIN SODIUM 5000 [USP'U]/ML
5000 INJECTION, SOLUTION INTRAVENOUS; SUBCUTANEOUS
Status: DISCONTINUED | OUTPATIENT
Start: 2023-08-11 | End: 2023-08-10

## 2023-08-10 RX ORDER — ACETAMINOPHEN 325 MG/1
975 TABLET ORAL ONCE
Status: COMPLETED | OUTPATIENT
Start: 2023-08-10 | End: 2023-08-10

## 2023-08-10 RX ORDER — ONDANSETRON 2 MG/ML
4 INJECTION INTRAMUSCULAR; INTRAVENOUS ONCE AS NEEDED
Status: DISCONTINUED | OUTPATIENT
Start: 2023-08-10 | End: 2023-08-10 | Stop reason: HOSPADM

## 2023-08-10 RX ORDER — MAGNESIUM HYDROXIDE 1200 MG/15ML
LIQUID ORAL AS NEEDED
Status: DISCONTINUED | OUTPATIENT
Start: 2023-08-10 | End: 2023-08-10 | Stop reason: HOSPADM

## 2023-08-10 RX ORDER — SCOLOPAMINE TRANSDERMAL SYSTEM 1 MG/1
1 PATCH, EXTENDED RELEASE TRANSDERMAL ONCE
Status: DISCONTINUED | OUTPATIENT
Start: 2023-08-10 | End: 2023-08-10

## 2023-08-10 RX ORDER — ROCURONIUM BROMIDE 10 MG/ML
INJECTION, SOLUTION INTRAVENOUS AS NEEDED
Status: DISCONTINUED | OUTPATIENT
Start: 2023-08-10 | End: 2023-08-10

## 2023-08-10 RX ADMIN — ONDANSETRON 4 MG: 2 INJECTION INTRAMUSCULAR; INTRAVENOUS at 16:52

## 2023-08-10 RX ADMIN — DEXAMETHASONE SODIUM PHOSPHATE 10 MG: 10 INJECTION INTRAMUSCULAR; INTRAVENOUS at 16:52

## 2023-08-10 RX ADMIN — HEPARIN SODIUM 5000 UNITS: 5000 INJECTION INTRAVENOUS; SUBCUTANEOUS at 14:38

## 2023-08-10 RX ADMIN — KETAMINE HYDROCHLORIDE 25 MG: 100 INJECTION, SOLUTION, CONCENTRATE INTRAMUSCULAR; INTRAVENOUS at 17:18

## 2023-08-10 RX ADMIN — SODIUM CHLORIDE, SODIUM LACTATE, POTASSIUM CHLORIDE, AND CALCIUM CHLORIDE: .6; .31; .03; .02 INJECTION, SOLUTION INTRAVENOUS at 17:04

## 2023-08-10 RX ADMIN — FENTANYL CITRATE 100 MCG: 50 INJECTION INTRAMUSCULAR; INTRAVENOUS at 16:34

## 2023-08-10 RX ADMIN — KETAMINE HYDROCHLORIDE 25 MG: 100 INJECTION, SOLUTION, CONCENTRATE INTRAMUSCULAR; INTRAVENOUS at 17:20

## 2023-08-10 RX ADMIN — CELECOXIB 200 MG: 200 CAPSULE ORAL at 13:24

## 2023-08-10 RX ADMIN — PROPOFOL 200 MG: 10 INJECTION, EMULSION INTRAVENOUS at 16:33

## 2023-08-10 RX ADMIN — PROPOFOL 50 MG: 10 INJECTION, EMULSION INTRAVENOUS at 17:17

## 2023-08-10 RX ADMIN — PROPOFOL 200 MCG/KG/MIN: 10 INJECTION, EMULSION INTRAVENOUS at 16:39

## 2023-08-10 RX ADMIN — OXYCODONE HYDROCHLORIDE 5 MG: 5 TABLET ORAL at 19:36

## 2023-08-10 RX ADMIN — MIDAZOLAM 2 MG: 1 INJECTION INTRAMUSCULAR; INTRAVENOUS at 16:23

## 2023-08-10 RX ADMIN — LIDOCAINE HYDROCHLORIDE 100 MG: 20 INJECTION, SOLUTION EPIDURAL; INFILTRATION; INTRACAUDAL at 16:33

## 2023-08-10 RX ADMIN — SCOPALAMINE 1 PATCH: 1 PATCH, EXTENDED RELEASE TRANSDERMAL at 13:44

## 2023-08-10 RX ADMIN — KETOROLAC TROMETHAMINE 30 MG: 30 INJECTION, SOLUTION INTRAMUSCULAR at 17:14

## 2023-08-10 RX ADMIN — ROCURONIUM BROMIDE 50 MG: 10 INJECTION, SOLUTION INTRAVENOUS at 16:34

## 2023-08-10 RX ADMIN — ACETAMINOPHEN 325MG 975 MG: 325 TABLET ORAL at 13:23

## 2023-08-10 RX ADMIN — SUGAMMADEX 200 MG: 100 INJECTION, SOLUTION INTRAVENOUS at 17:14

## 2023-08-10 RX ADMIN — SODIUM CHLORIDE 125 ML/HR: 0.9 INJECTION, SOLUTION INTRAVENOUS at 13:42

## 2023-08-10 RX ADMIN — CEFAZOLIN SODIUM 1000 MG: 1 SOLUTION INTRAVENOUS at 16:31

## 2023-08-10 NOTE — ANESTHESIA PREPROCEDURE EVALUATION
Procedure:  LAP RIGHT OOPHORECTOMY, PERITONEAL BIOPSIES, OMENTECTOMY (Right: Uterus)    Relevant Problems   NEURO/PSYCH   (+) Depression   (+) Epilepsy ([address])      PULMONARY   (+) Smoking      Other   (+) Bipolar 1 disorder (HCC)        Physical Exam    Airway    Mallampati score: II  TM Distance: >3 FB  Neck ROM: full     Dental   upper dentures,     Cardiovascular  Rhythm: regular, Rate: normal, Cardiovascular exam normal    Pulmonary  Pulmonary exam normal Breath sounds clear to auscultation,     Other Findings  Partial       Anesthesia Plan  ASA Score- 3     Anesthesia Type- general with ASA Monitors. Additional Monitors:   Airway Plan: ETT. Comment: H/o  PONV   Scope patch . Plan Factors-Exercise tolerance (METS): >4 METS. Chart reviewed. Existing labs reviewed. Patient is a current smoker. Obstructive sleep apnea risk education given perioperatively. Induction- intravenous. Postoperative Plan-     Informed Consent- Anesthetic plan and risks discussed with patient.

## 2023-08-10 NOTE — DISCHARGE INSTR - AVS FIRST PAGE
Laparoscopic oophorectomy and staging biopsies  Discharge Instructions    WHAT YOU NEED TO KNOW:   Today we removed your right ovary and 2 biopsies from the pelvic lining and the omentum (fatty apron that hangs in front of the abdominal organs). There was no evidence of residual tumor. Your procedures were uncomplicated. DISCHARGE INSTRUCTIONS:     Apply ice pack to incisions for the first 24 to 48 hours to manage discomfort and or pain. If your pain is not controlled with the measures recommended below, contact Dr. Matt Fair directly on his cell phone at [phone]. For other issues or concerns contact [phone] day or night to speak with the person on call. Contact your doctor at the number above if:   You have a fever over 101o. You have nausea or are vomiting that does not improve after a light meal.   Your pain is getting worse, even after you take medicine. You feel pain or burning when you urinate, or you have trouble urinating. You have pus or a foul-smelling odor coming from your vagina and/or wound. Your wound is red, swollen, or draining pus. You see new or an increased amount of bright red blood coming from your vagina or your incisions. You have questions or concerns about your condition or care. Seek care immediately:   Your arm or leg feels warm, tender, and painful. It may look swollen and red. You have increasing abdominal or pelvic pain. You have heavy vaginal bleeding that fills 1 or more sanitary pads in 1 hour. Call 911 for any of the following: You feel lightheaded, short of breath, and have chest pain. You cough up blood. Medicines: You may need any of the following:  Prescription medicine will not be given for this procedure. Resume your previous medications as directed. Extra-strength Tylenol: This medications over-the-counter. Take 2 tablets every 6 hours as needed for mild-to-moderate pain. Ibuprofen/Advil/Motrin 200 mg tablets:   This medication is over-the-counter. Take 3 tablets every 8 hours as needed for moderate to severe pain. Take this medication with food. The drink plenty of fluids while using this medication to prevent kidney injury. Metamucil or MiraLax: This product is over-the-counter. Distal 1 large tbsp in a large glass of water. Use once or twice a day for 1-2 weeks prevent and or treat constipation. Take your medicines as directed. Contact your healthcare provider if you think your medicine is not helping or if you have side effects. Tell him or her if you are allergic to any medicine. Keep a list of the medicines, vitamins, and herbs you take. Include the amounts, and when and why you take them. Bring the list or the pill bottles to follow-up visits. Carry your medicine list with you in case of an emergency. Activity:   Rest as needed. Get up and move around as directed to help prevent blood clots. Start with short walks and slowly increase the distance every day. Limit the number of times you climb stairs to 2 times each day for the first week. Plan most of your daily activities on one level of your home. Do not lift objects heavier than 10 pounds until seen in the office for your follow-up appointment. Do not strain during bowel movements. High-fiber foods and extra liquids can help you prevent constipation. Examples of high-fiber foods are fruit and bran. Prune juice and water are good liquids to drink. Do not have sex, use tampons, or douche and do not do tub baths/swimming until cleared by your physician at your postoperative appointment. You may shower as soon as the day after surgery. Do not go into pools or hot tubs until cleared by your doctor. Ask when it is safe for you to drive. It is generally safe to drive as soon as your legs are strong, you are off pain medicines and you feel like you will have the appropriate reflexes to step on the breaks in case of an emergency.     Ask when you may return to work and to other regular activities. Wound care: Care for your abdominal incisions as directed. Carefully wash around the wound with soap and water. If you have Hibiclens or medicated soap that you were instructed to use before surgery, you may use that to wash with for up to 2 days after surgery. If not, any mild non-scented, non-abrasive soap is safe. It is okay to let the soap and water run over your incision. Do not scrub your incision. Dry the area and put on new, clean bandages as directed. Change your bandages when they get wet or dirty. If you have strips of medical tape, let them fall off on their own. It may take 7 to 14 days for them to fall off. Check your incision every day for redness, swelling, or pus. Deep breathing: Take deep breaths and cough 10 times each hour. This will decrease your risk for a lung infection. Take a deep breath and hold it for as long as you can. Let the air out and then cough strongly. Deep breaths help open your airway. You may be given an incentive spirometer to help you take deep breaths. Put the plastic piece in your mouth and take a slow, deep breath, then let the air out and cough. Repeat these steps 10 times every hour. Get support: This surgery may be life-changing for you and your family. You will no longer be able to get pregnant. Sudden changes in the levels of your hormones may occur and cause mood swings and depression. You may feel angry, sad, or frightened, or cry frequently and unexpectedly. These feelings are normal. Talk to your healthcare provider about where you can get support. You can also ask if hormone replacement medicine is right for you. Follow up with your healthcare provider or gynecologist as directed: You may need to return to have stitches removed, and for other tests. Write down your questions so you remember to ask them during your visits.       © 2017 [address] [place] [address] [place] Information is for End User's use only and may not be sold, redistributed or otherwise used for commercial purposes. All illustrations and images included in CareNotes® are the copyrighted property of [employer].D.A.M., Inc. or [employer]. The above information is an  only. It is not intended as medical advice for individual conditions or treatments. Talk to your doctor, nurse or pharmacist before following any medical regimen to see if it is safe and effective for you.

## 2023-08-10 NOTE — ASSESSMENT & PLAN NOTE
Incidentally found as a small focus on her right large benign ovarian cyst.  She is now status post hysterectomy, right salpingo-oophorectomy and left salpingectomy. Left ovary in situ. Tumor board recommended consideration of surgical removal of remaining ovary to follow inhibin levels. Patient consented for laparoscopic right oophorectomy, peritoneal biopsies, omentectomy and all other indicated procedures.     Indication, risks, benefits and alternatives of surgical exploration were discussed. Counseled on possibility of bleeding requiring blood transfusion, life-threatening infections requiring additional procedures, injuries to surrounding organs such as bladder, ureters, gastrointestinal tract and or neurovascular structures. Additionally, discussed general risks associated to stress of surgery such as venous thromboembolism, acute myocardial events and stroke. Patient consents for blood transfusions if those are deemed necessary during the course of care. She understands risks include but are not limited to hypersensitivity reactions and infection with blood-borne pathogens. Informed consent form signed.

## 2023-08-10 NOTE — OP NOTE
OPERATIVE REPORT  PATIENT NAME: Geovany Kimball    :  1975  MRN: [id number]  Pt Location: [facility]    SURGERY DATE: 8/10/2023    Surgeon(s) and Role:     * Prieto Beebe MD - Primary     * Derrell Lujan MD - Assisting    Preop Diagnosis:  Granulosa cell tumor of left ovary [D39.12]    Post-Op Diagnosis Codes:     * Granulosa cell tumor of left ovary [D39.12]    Procedure(s):  Right - LAP RIGHT OOPHORECTOMY. PERITONEAL BIOPSIES. OMENTAL BIOPSY    Specimen(s):  ID Type Source Tests Collected by Time Destination   1 : Right ovary Tissue Ovary, Right TISSUE EXAM Prieto Beebe MD 8/10/2023 165    2 : Pelvic peritoneal biopsies Tissue Pelvic TISSUE EXAM Prieto Beebe MD 8/10/2023 1657    3 : Omentum Tissue Omentum TISSUE EXAM Prieto Beebe MD 8/10/2023 1702        Estimated Blood Loss:   Minimal    Drains:  NG/OG/Enteral Tube Orogastric 18 Fr Center mouth (Active)   Number of days: 0       Anesthesia Type:   General    Operative Indications:  Granulosa cell tumor of left ovary [D39.12]      Operative Findings:  #1.  Surgically absent uterus, cervix, left fallopian tube and ovary and right fallopian tube. #2.  Mild pelvic adhesive disease consistent with recent surgical intervention. No gross evidence of residual granulosa cell tumor. #3.  Grossly normal right ovary. Complications:   None    Procedure and Technique:  The patient was taken to the operating room where general endotracheal anesthesia was induced without complications. Sequential compression devices were activated and medical deep vein thrombosis prophylaxis was administered per hospital protocol. The patient was placed in the dorsal lithotomy position using Manish stirrups and her abdomen, perineum and vagina were prepped and draped in the usual sterile fashion. A 10 mm skin incision was made at the base of the umbilicus with an 11 blade knife.   Please note that this and all other incisions were infiltrated with 0.25% bupivacaine at the beginning and completion of the procedure. Using a 5 mm Endopath Excel trocar and a 5 mm laparoscope the peritoneal cavity was entered under direct visualization. Good intraperitoneal location was confirmed and pneumoperitoneum was created to maximal pressure of 15 mmHg. The patient was placed in steep Trendelenburg and the above-mentioned findings were noted. Two additional 5 mm trocars were placed in a similar fashion in the right and left lower quadrants under direct visualization without incident and the umbilical trocar was upsized to an 11 mm cannula. The ureters were clearly identified on both sides. Pelvic peritoneal biopsies were taken from the right and left pelvic sidewalls. Then, distal omentum which was adherent to the right side of the pelvis was divided and a representative portion of the distal omentum was taken as a biopsy. All the specimens were removed through the 10 mm umbilical trocar. The peritoneum lateral to the right ovary was divided and the ovary and ovarian vessels were mobilized off of the right pelvic sidewall after clear identification of the ureter. The right ovary was elevated and the ovarian vessels were cauterized and divided at the level of the pelvic brim. The right ovary was retrieved individually and extracted through the umbilical trocar site. Excellent hemostasis was confirmed at low intraperitoneal pressures. The fascia at the umbilical trocar site was closed using a deep stitch of zero Vicryl . Laparoscopic assessment revealed good airtight umbilical trocar site closure. All trocars were removed and pneumoperitoneum was completely released with the assistance of Valsalva maneuvers. The skin at all port sites was closed using a subcuticular stitch of 4-0 Monocryl and Exofin was applied. The patient tolerated the procedure well, sponge, lap, needle and instrument counts were all reported as correct ×2.   The patient was successfully extubated and awakened in the operating room and transferred to the postanesthesia care unit in stable condition. I was present for the entire procedure.     Patient Disposition:  PACU     Tashi Seymour MD, [facility]  8/10/2023  5:16 PM

## 2023-08-10 NOTE — H&P
For questions/concerns on this patient, please reach out to the following:  [facility]       H&P Exam - Gynecology Oncology  Jessica Wray 50 y.o. female MRN: [id number]  Unit/Bed#: OR POOL Encounter: [id number]        Assessment/Plan   * Granulosa cell tumor of left ovary  Assessment & Plan  Incidentally found as a small focus on her right large benign ovarian cyst.  She is now status post hysterectomy, right salpingo-oophorectomy and left salpingectomy. Left ovary in situ. Tumor board recommended consideration of surgical removal of remaining ovary to follow inhibin levels. Patient consented for laparoscopic right oophorectomy, peritoneal biopsies, omentectomy and all other indicated procedures.     Indication, risks, benefits and alternatives of surgical exploration were discussed. Counseled on possibility of bleeding requiring blood transfusion, life-threatening infections requiring additional procedures, injuries to surrounding organs such as bladder, ureters, gastrointestinal tract and or neurovascular structures. Additionally, discussed general risks associated to stress of surgery such as venous thromboembolism, acute myocardial events and stroke. Patient consents for blood transfusions if those are deemed necessary during the course of care. She understands risks include but are not limited to hypersensitivity reactions and infection with blood-borne pathogens. Informed consent form signed. History of Present Illness     HPI:  Jessica Wray is a 50 y.o. female who presents for scheduled laparoscopic right oophorectomy with peritoneal biopsies. 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 on 2/23/23. Today she has no complaints or changes in her medical history. She overall feels well. Oncology History    No history exists. Review of Systems   Constitutional: Negative. HENT: Negative. Respiratory: Negative. Cardiovascular: Negative. Gastrointestinal: Negative. Genitourinary: Negative. Musculoskeletal: Negative. Neurological: Negative. Psychiatric/Behavioral: Negative. All other systems reviewed and are negative. Historical Information   Past Medical History:   Diagnosis Date   • Depression     Lamictal to control bipolar   • Epilepsy ([address])     Teenage into adulthood. No seizures in 19 years   • Miscarriage     Elective  @ 12years old. Miscarriage between 1st and 2nd child   • Seizures ([address]) 1989    Episode free for over 10 years now (maybe 21? )   • Varicella     Childhood, 3rd grade or so.  Very mild case     Past Surgical History:   Procedure Laterality Date   • CYSTOSCOPY N/A 2023    Procedure: CYSTOSCOPY;  Surgeon: Jose Monroy MD;  Location: [facility];  Service: Gynecology Oncology   • HYSTERECTOMY N/A 2023    Procedure: ROBOTIC HYSTERECTOMY, LEFT SALPINGO-OOPHORECTOMY, RIGHT SAL[PINGECTOMY, INDICATED APPENDECTOMY;  Surgeon: Jose Monroy MD;  Location: [facility];  Service: Gynecology Oncology   • DE LAPS FULG/EXC OVARY VISCERA/PERITONEAL SURFACE N/A 2023    Procedure: ROBOT  RESECTION PELVIC MASS;  Surgeon: Jose Monroy MD;  Location: [facility];  Service: Gynecology Oncology   • TUBAL LIGATION       OB History    Para Term  AB Living   4 2 2   1 2   SAB IAB Ectopic Multiple Live Births   1       2      # Outcome Date GA Lbr Ender/2nd Weight Sex Delivery Anes PTL Lv   4             3 SAB            2 Term      Vag-Spont   DOLLY   1 Term     F Vag-Spont   DOLLY     Family History   Problem Relation Age of Onset   • No Known Problems Mother    • No Known Problems Father    • No Known Problems Daughter    • No Known Problems Maternal Grandmother    • No Known Problems Maternal Grandfather    • No Known Problems Paternal Grandmother    • No Known Problems Paternal Grandfather    • No Known Problems Maternal Aunt    • No Known Problems Maternal Aunt    • No Known Problems Maternal Aunt    • No Known Problems Maternal Aunt    • No Known Problems Paternal Aunt    • No Known Problems Paternal Aunt      Social History   Social History     Substance and Sexual Activity   Alcohol Use Not Currently    Comment: Drink maybe once a year or more, but not often     Social History     Substance and Sexual Activity   Drug Use Yes   • Types: Marijuana    Comment: last used 2/22/23 pm     Social History     Tobacco Use   Smoking Status Every Day   • Packs/day: 1.00   • Years: 25.00   • Total pack years: 25.00   • Types: Cigarettes   Smokeless Tobacco Never   Tobacco Comments    I know i can quit, just awaiting [facility] i know i CAN'T quit when he still smokes       Meds/Allergies   Medications Prior to Admission   Medication   • Dihydrotachysterol (DHT PO)   • lamoTRIgine (LaMICtal) 100 mg tablet   • multivitamin (THERAGRAN) TABS   • CALCIUM PO   • VITAMIN D PO     Allergies   Allergen Reactions   • Latex Hives, Itching and Rash   • Medical Tape Hives, Itching and Rash   • Shellfish Allergy - Food Allergy Hives, Itching, Rash, Swelling and Vomiting       Objective     /67   Pulse 68   Temp 97.6 °F (36.4 °C) (Temporal)   Resp 18   Ht 5' 2.5" (1.588 m)   Wt 69.2 kg (152 lb 8.9 oz)   LMP 12/26/2022 (Exact Date)   SpO2 97%   BMI 27.46 kg/m²     No intake/output data recorded. No intake/output data recorded. Lab Results   Component Value Date    WBC 7.94 02/16/2023    HGB 14.1 02/16/2023    HCT 43.3 02/16/2023    MCV 96 02/16/2023     (H) 02/16/2023       Lab Results   Component Value Date    BUN 6 02/09/2023    CREATININE 0.80 02/09/2023       Physical Exam  Vitals reviewed. Constitutional:       General: She is not in acute distress. Appearance: She is normal weight. HENT:      Mouth/Throat:      Mouth: Mucous membranes are moist.      Pharynx: Oropharynx is clear.    Eyes:      Conjunctiva/sclera: Conjunctivae normal. Cardiovascular:      Rate and Rhythm: Normal rate and regular rhythm. Pulmonary:      Effort: Pulmonary effort is normal. No respiratory distress. Abdominal:      General: Abdomen is flat. There is no distension. Palpations: Abdomen is soft. Tenderness: There is no abdominal tenderness. Skin:     General: Skin is warm and dry. Neurological:      General: No focal deficit present. Mental Status: She is alert. Mental status is at baseline. Imaging: I have personally reviewed pertinent reports. EKG, Pathology, and Other Studies: I have personally reviewed pertinent reports.       Code Status: No Order    Kirsten Rehman MD  8/10/2023  4:02 PM

## 2023-08-11 LAB — INHIBIN B SERPL-MCNC: <7 PG/ML

## 2023-08-11 NOTE — ANESTHESIA POSTPROCEDURE EVALUATION
Post-Op Assessment Note    CV Status:  Stable    Pain management: adequate     Mental Status:  Alert and awake   Hydration Status:  Euvolemic   PONV Controlled:  Controlled   Airway Patency:  Patent      Post Op Vitals Reviewed: Yes      Staff: Anesthesiologist         No notable events documented.     BP      Temp      Pulse     Resp      SpO2      /73   Pulse 67   Temp 97.8 °F (36.6 °C) (Temporal)   Resp 12   Ht 5' 2.5" (1.588 m)   Wt 69.2 kg (152 lb 8.9 oz)   LMP 12/26/2022 (Exact Date)   SpO2 97%   BMI 27.46 kg/m²

## 2023-08-16 PROCEDURE — 88307 TISSUE EXAM BY PATHOLOGIST: CPT | Performed by: PATHOLOGY

## 2023-08-16 PROCEDURE — 88342 IMHCHEM/IMCYTCHM 1ST ANTB: CPT | Performed by: PATHOLOGY

## 2023-08-16 PROCEDURE — 88305 TISSUE EXAM BY PATHOLOGIST: CPT | Performed by: PATHOLOGY

## 2023-08-25 ENCOUNTER — TELEPHONE (OUTPATIENT)
Dept: GYNECOLOGIC ONCOLOGY | Facility: CLINIC | Age: 48
End: 2023-08-25

## 2023-08-25 ENCOUNTER — OFFICE VISIT (OUTPATIENT)
Dept: GYNECOLOGIC ONCOLOGY | Facility: CLINIC | Age: 48
End: 2023-08-25

## 2023-08-25 ENCOUNTER — TELEPHONE (OUTPATIENT)
Dept: HEMATOLOGY ONCOLOGY | Facility: CLINIC | Age: 48
End: 2023-08-25

## 2023-08-25 VITALS
OXYGEN SATURATION: 98 % | SYSTOLIC BLOOD PRESSURE: 124 MMHG | DIASTOLIC BLOOD PRESSURE: 80 MMHG | HEART RATE: 90 BPM | RESPIRATION RATE: 12 BRPM | WEIGHT: 151.8 LBS | TEMPERATURE: 97.9 F | HEIGHT: 63 IN | BODY MASS INDEX: 26.9 KG/M2

## 2023-08-25 DIAGNOSIS — D39.12 GRANULOSA CELL TUMOR OF LEFT OVARY: Primary | ICD-10-CM

## 2023-08-25 PROCEDURE — 99024 POSTOP FOLLOW-UP VISIT: CPT | Performed by: OBSTETRICS & GYNECOLOGY

## 2023-08-25 NOTE — ASSESSMENT & PLAN NOTE
Patient is recovering well from surgery. Minimal neuropathic pain around the groin area of uncertain etiology. She will contact us if the pain does not resolve over the next 1 to 2 weeks and we will consider imaging to rule out fluid collections/compression of nerve bundles. Plan to see her back in 4 to 6 months with previsit inhibin B levels. She will contact us sooner if she has any new symptoms.

## 2023-08-25 NOTE — PATIENT INSTRUCTIONS
Turn to the office in 4 to 6 months with previsit inhibin B levels. Contact us if your groin pain does not resolve in the next 2 weeks. Call if you have any questions or concerns.

## 2023-08-25 NOTE — PROGRESS NOTES
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     Patient is recovering well from surgery. Minimal neuropathic pain around the groin area of uncertain etiology. She will contact us if the pain does not resolve over the next 1 to 2 weeks and we will consider imaging to rule out fluid collections/compression of nerve bundles. Plan to see her back in 4 to 6 months with previsit inhibin B levels. She will contact us sooner if she has any new symptoms. Relevant Orders    Inhibin B         CHIEF COMPLAINT:       Problem:  Cancer Staging   Granulosa cell tumor of left ovary  Staging form: Ovary, Fallopian Tube, Primary Peritoneal, AJCC 8th Edition  - Pathologic stage from 2/23/2023: FIGO Stage IC1, calculated as Stage Unknown (pT1c1, pNX, cM0) - Signed by Angeli Velazquez MD on 8/25/2023        Previous therapy:  Oncology History   Granulosa cell tumor of left ovary   2/23/2023 -  Cancer Staged    Staging form: Ovary, Fallopian Tube, Primary Peritoneal, AJCC 8th Edition  - Pathologic stage from 2/23/2023: FIGO Stage IC1, calculated as Stage Unknown (pT1c1, pNX, cM0) - Signed by Angeli Velazquez MD on 8/25/2023  Residual tumor (R): R0 - None       2/23/2023 Surgery    ROBOT  RESECTION PELVIC MASS  ROBOTIC HYSTERECTOMY. LEFT SALPINGO-OOPHORECTOMY. RIGHT SAL[PINGECTOMY. INDICATED APPENDECTOMY  CYSTOSCOPY    Operative Findings:  #1. Approximately 20 to 25 cm large cystic mass replacing the entire left ovary. Simple cystic appearing on imaging and confirmed by gross pathologic assessment. Mass was aspirated for decompression and delivered through the vagina with some spillage of fluid contents and old hemorrhage. No solid  elements to freeze. #2.  Grossly normal right fallopian tube and ovary. #3.  Approximately 8-week size uterus. #4.  Grossly normal appendix.   However, given the size of the mass and serum mucinous appearance of contents indicated appendectomy was performed. An appendectomy minimal spillage of fecaliths was noted. This was all washed out. However, given gross contamination antibiotics will be empirically prescribed. #5.  Cystoscopy demonstrated normal bladder mucosa and bilateral patent ureteral urine jets. Pathology:  Cystic mass had 1.5 focus of granulosa cell tumor. Capsular rupture consistent with stage I C1. See subsequent imaging/surgeries for completion of staging.     3/21/2023 Initial Diagnosis    Granulosa cell tumor of left ovary     8/10/2023 Surgery    LAP RIGHT OOPHORECTOMY. PERITONEAL BIOPSIES. OMENTAL BIOPSY    No evidence of residual/metastatic tumor. Patient ID: Belinda Dove is a 50 y.o. female  HPI  Patient is status post original hysterectomy, resection of large ovarian mass with contralateral ovarian preservation in February. Pathology incidentally demonstrated a small focus of granulosa cell tumor. After counseling, she elected to undergo contralateral oophorectomy. This was performed on August 10. Her surgery was uncomplicated. She had a right oophorectomy, peritoneal biopsies and partial omentectomy. All biopsies benign. She has recovered well. Reports some pain and tingling sensation around right groin area which started a few days after surgery. Unclear etiology. Denies vaginal bleeding, drainage or discharge. Normal bowel bladder function. The following portions of the patient's history were reviewed and updated as appropriate: allergies, current medications, past family history, past medical history, past social history, past surgical history and problem list.    Review of Systems  As per HPI. Review of systems otherwise unremarkable.   Current Outpatient Medications   Medication Sig Dispense Refill   • acetaminophen (TYLENOL) 325 mg tablet Take 2 tablets (650 mg total) by mouth every 6 (six) hours 60 tablet 0   • Dihydrotachysterol (DHT PO) Take by mouth     • ibuprofen (MOTRIN) 600 mg tablet Take 1 tablet (600 mg total) by mouth every 6 (six) hours as needed for mild pain 90 tablet 0   • lamoTRIgine (LaMICtal) 100 mg tablet      • multivitamin (THERAGRAN) TABS Take 1 tablet by mouth daily     • Probiotic Product (UP4 PROBIOTICS PO)      • CALCIUM PO Take by mouth     • VITAMIN D PO Take by mouth       No current facility-administered medications for this visit. Objective:    Blood pressure 124/80, pulse 90, temperature 97.9 °F (36.6 °C), temperature source Temporal, resp. rate 12, height 5' 2.5" (1.588 m), weight 68.9 kg (151 lb 12.8 oz), last menstrual period 12/26/2022, SpO2 98 %. Body mass index is 27.32 kg/m². Body surface area is 1.71 meters squared. Physical Exam  Abdomen: Incisions well-healed. No hernias.     Christine Robles MD, [id number] [place] [facility] [address]  8/25/2023  2:32 PM

## 2023-08-25 NOTE — TELEPHONE ENCOUNTER
Call Transfer   Who are you speaking with? Patient   If it is not the patient, are they listed on an active communication consent form? Yes   Who is the patients HemOnc/SurgOnc provider? Dr. Maryanne Barrios   What is the reason for this call? Returning call too  [facility]    Person/Department that the call was transferred to? Time that call was transferred? Transferred to [facility]    Your call will be transferred now. If you receive a voicemail, please leave a detailed message and a member of the team will return your call as soon as possible. Did you relay this information to the caller?   Yes

## 2023-08-25 NOTE — TELEPHONE ENCOUNTER
Called Patient left a voicemail to make sure she has insurance referral for appointment this afternoon @145 for Post Op. If she has other insurance to bring that along with her.

## 2023-08-25 NOTE — LETTER
August 25, 2023     Nona John, [address]    Patient: Davonte Sheth   YOB: 1975   Date of Visit: 8/25/2023       Dear Dr. Lio Bach: Thank you for referring Davonte Sheth to me for evaluation. Below are my notes for this consultation. If you have questions, please do not hesitate to call me. I look forward to following your patient along with you. Sincerely,        Esequiel Magallon MD        CC: No Recipients    Esequiel Magallon MD  8/25/2023  2:32 PM  Incomplete  Assessment/Plan:    Problem List Items Addressed This Visit          Endocrine    Granulosa cell tumor of left ovary - Primary     Patient is recovering well from surgery. Minimal neuropathic pain around the groin area of uncertain etiology. She will contact us if the pain does not resolve over the next 1 to 2 weeks and we will consider imaging to rule out fluid collections/compression of nerve bundles. Plan to see her back in 4 to 6 months with previsit inhibin B levels. She will contact us sooner if she has any new symptoms. Relevant Orders    Inhibin B         CHIEF COMPLAINT:       Problem:  Cancer Staging   Granulosa cell tumor of left ovary  Staging form: Ovary, Fallopian Tube, Primary Peritoneal, AJCC 8th Edition  - Pathologic stage from 2/23/2023: FIGO Stage IC1, calculated as Stage Unknown (pT1c1, pNX, cM0) - Signed by Esequiel Magallon MD on 8/25/2023        Previous therapy:  Oncology History   Granulosa cell tumor of left ovary   2/23/2023 -  Cancer Staged    Staging form: Ovary, Fallopian Tube, Primary Peritoneal, AJCC 8th Edition  - Pathologic stage from 2/23/2023: FIGO Stage IC1, calculated as Stage Unknown (pT1c1, pNX, cM0) - Signed by Esequiel Magallon MD on 8/25/2023  Residual tumor (R): R0 - None       2/23/2023 Surgery    ROBOT  RESECTION PELVIC MASS  ROBOTIC HYSTERECTOMY. LEFT SALPINGO-OOPHORECTOMY. RIGHT SAL[PINGECTOMY.  INDICATED APPENDECTOMY  CYSTOSCOPY    Operative Findings:  #1. Approximately 20 to 25 cm large cystic mass replacing the entire left ovary. Simple cystic appearing on imaging and confirmed by gross pathologic assessment. Mass was aspirated for decompression and delivered through the vagina with some spillage of fluid contents and old hemorrhage. No solid  elements to freeze. #2.  Grossly normal right fallopian tube and ovary. #3.  Approximately 8-week size uterus. #4.  Grossly normal appendix. However, given the size of the mass and serum mucinous appearance of contents indicated appendectomy was performed. An appendectomy minimal spillage of fecaliths was noted. This was all washed out. However, given gross contamination antibiotics will be empirically prescribed. #5.  Cystoscopy demonstrated normal bladder mucosa and bilateral patent ureteral urine jets. Pathology:  Cystic mass had 1.5 focus of granulosa cell tumor. Capsular rupture consistent with stage I C1. See subsequent imaging/surgeries for completion of staging.     3/21/2023 Initial Diagnosis    Granulosa cell tumor of left ovary     8/10/2023 Surgery    LAP RIGHT OOPHORECTOMY. PERITONEAL BIOPSIES. OMENTAL BIOPSY    No evidence of residual/metastatic tumor. Patient ID: Cary Su is a 50 y.o. female  HPI  Patient is status post original hysterectomy, resection of large ovarian mass with contralateral ovarian preservation in February. Pathology incidentally demonstrated a small focus of granulosa cell tumor. After counseling, she elected to undergo contralateral oophorectomy. This was performed on August 10. Her surgery was uncomplicated. She had a right oophorectomy, peritoneal biopsies and partial omentectomy. All biopsies benign. She has recovered well. Reports some pain and tingling sensation around right groin area which started a few days after surgery. Unclear etiology. Denies vaginal bleeding, drainage or discharge. Normal bowel bladder function. The following portions of the patient's history were reviewed and updated as appropriate: allergies, current medications, past family history, past medical history, past social history, past surgical history and problem list.    Review of Systems  As per HPI. Review of systems otherwise unremarkable. Current Outpatient Medications   Medication Sig Dispense Refill   • acetaminophen (TYLENOL) 325 mg tablet Take 2 tablets (650 mg total) by mouth every 6 (six) hours 60 tablet 0   • Dihydrotachysterol (DHT PO) Take by mouth     • ibuprofen (MOTRIN) 600 mg tablet Take 1 tablet (600 mg total) by mouth every 6 (six) hours as needed for mild pain 90 tablet 0   • lamoTRIgine (LaMICtal) 100 mg tablet      • multivitamin (THERAGRAN) TABS Take 1 tablet by mouth daily     • Probiotic Product (UP4 PROBIOTICS PO)      • CALCIUM PO Take by mouth     • VITAMIN D PO Take by mouth       No current facility-administered medications for this visit. Objective:    Blood pressure 124/80, pulse 90, temperature 97.9 °F (36.6 °C), temperature source Temporal, resp. rate 12, height 5' 2.5" (1.588 m), weight 68.9 kg (151 lb 12.8 oz), last menstrual period 12/26/2022, SpO2 98 %. Body mass index is 27.32 kg/m². Body surface area is 1.71 meters squared. Physical Exam  Abdomen: Incisions well-healed. No hernias.     Rashi Castano MD, [place], [facility]  8/25/2023  2:32 PM

## 2023-10-24 ENCOUNTER — TELEPHONE (OUTPATIENT)
Dept: HEMATOLOGY ONCOLOGY | Facility: CLINIC | Age: 48
End: 2023-10-24

## 2023-10-24 NOTE — TELEPHONE ENCOUNTER
Appointment Change  Cancel, Reschedule, Change to Virtual      Who are you speaking with? Patient   If it is not the patient, is the caller listed on the communication consent form? N/A   Which provider is the appointment scheduled with? Dr. Connie Connors   When was the original appointment scheduled? Please list date and time 12/19/23 @ 10:45am   [facility]   Was the appointment rescheduled? Was the appointment changed from an in person visit to a virtual visit? If so, please list the details of the change. Yes 12/22/23 @ 8:15am   What is the reason for the appointment change? Patient has a conflict with work schdule        Was STAR transport scheduled? No   Does STAR transport need to be scheduled for the new visit (if applicable) No   Does the patient need an infusion appointment rescheduled? No   Does the patient have an upcoming infusion appointment scheduled? If so, when? No   Is the patient undergoing chemotherapy? No   For appointments cancelled with less than 24 hours:  Was the no-show policy reviewed?  Yes

## 2023-12-18 ENCOUNTER — TELEPHONE (OUTPATIENT)
Age: 48
End: 2023-12-18

## 2023-12-18 NOTE — TELEPHONE ENCOUNTER
[facility]N [facility] called asking for an insurance referral because the insurance is requesting a referral before her appt on Friday. Pt does not have a pcp to request a referral from, and they noted that Dr. Eastman has completed a referral in the past. Requesting to fax it to: [phone]    Call back [phone]

## 2023-12-20 ENCOUNTER — TELEPHONE (OUTPATIENT)
Dept: GYNECOLOGIC ONCOLOGY | Facility: CLINIC | Age: 48
End: 2023-12-20

## 2023-12-20 NOTE — TELEPHONE ENCOUNTER
Left a message for Patient to call back and schedule an appointment between now and February with Dr. Kahn.

## 2023-12-21 NOTE — TELEPHONE ENCOUNTER
Insurance referral being reviewed by practice admin. Patient has cancelled her appointment for this Friday and has yet to reschedule.

## 2024-01-12 ENCOUNTER — TELEPHONE (OUTPATIENT)
Dept: HEMATOLOGY ONCOLOGY | Facility: CLINIC | Age: 49
End: 2024-01-12

## 2024-01-12 NOTE — TELEPHONE ENCOUNTER
Appointment Change  Cancel, Reschedule, Change to Virtual      Who are you speaking with? Patient   If it is not the patient, is the caller listed on the communication consent form? N/A   Which provider is the appointment scheduled with? Dr. Rodriguez   When was the original appointment scheduled?    Please list date and time 12/22/2023 @8:15AM    At which location is the appointment scheduled to take place? [place]   Was the appointment rescheduled?     Was the appointment changed from an in person visit to a virtual visit?    If so, please list the details of the change. Yes, 02/23/2024 @ 1:45PM    What is the reason for the appointment change? Scheduling conflict

## 2024-02-09 ENCOUNTER — APPOINTMENT (OUTPATIENT)
Dept: URGENT CARE | Facility: CLINIC | Age: 49
End: 2024-02-09
Payer: COMMERCIAL

## 2024-02-09 ENCOUNTER — APPOINTMENT (OUTPATIENT)
Dept: LAB | Facility: HOSPITAL | Age: 49
End: 2024-02-09
Payer: COMMERCIAL

## 2024-02-09 DIAGNOSIS — D39.12 NEOPLASM OF UNCERTAIN BEHAVIOR OF LEFT OVARY: ICD-10-CM

## 2024-02-09 PROCEDURE — 83520 IMMUNOASSAY QUANT NOS NONAB: CPT

## 2024-02-12 LAB — INHIBIN B SERPL-MCNC: <7 PG/ML

## 2024-02-23 ENCOUNTER — OFFICE VISIT (OUTPATIENT)
Dept: GYNECOLOGIC ONCOLOGY | Facility: CLINIC | Age: 49
End: 2024-02-23
Payer: COMMERCIAL

## 2024-02-23 VITALS
DIASTOLIC BLOOD PRESSURE: 72 MMHG | SYSTOLIC BLOOD PRESSURE: 122 MMHG | OXYGEN SATURATION: 97 % | HEIGHT: 63 IN | HEART RATE: 66 BPM | TEMPERATURE: 97.9 F | WEIGHT: 146.8 LBS | BODY MASS INDEX: 26.01 KG/M2

## 2024-02-23 DIAGNOSIS — D39.12 GRANULOSA CELL TUMOR OF LEFT OVARY: Primary | ICD-10-CM

## 2024-02-23 PROCEDURE — 99213 OFFICE O/P EST LOW 20 MIN: CPT | Performed by: OBSTETRICS & GYNECOLOGY

## 2024-02-23 NOTE — ASSESSMENT & PLAN NOTE
I have reviewed results of previsit inhibin B levels, less than 7.0.  This is reassuring.    On exam, there is no evidence of disease.  I plan to see her back in 6 months for routine surveillance visit.    I have ordered inhibin B levels to be drawn prior to our next visit in 6 months.

## 2024-02-23 NOTE — LETTER
February 23, 2024     Ben Hudson MD  [address]  [address] [address]    Patient: Shalonda Akers   YOB: 1975   Date of Visit: 2/23/2024       Dear Dr. Hudson:    Thank you for referring Shalonda Akers to me for evaluation. Below are my notes for this consultation.    If you have questions, please do not hesitate to call me. I look forward to following your patient along with you.         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  This is reassuring.    On exam, there is no evidence of disease.  I plan to see her back in 6 months for routine surveillance visit.    I have ordered inhibin B levels to be drawn prior to our next visit in 6 months.              CHIEF COMPLAINT:   Routine surveillance for granulosa cell tumor of the ovary.    Problem:  Cancer Staging   Granulosa cell tumor of left ovary  Staging form: Ovary, Fallopian Tube, Primary Peritoneal, AJCC 8th Edition  - Pathologic stage from 2/23/2023: FIGO Stage IC1, calculated as Stage Unknown (pT1c1, pNX, cM0) - Signed by Hector Rodriguez MD on 8/25/2023        Previous therapy:  Oncology History   Granulosa cell tumor of left ovary   2/23/2023 -  Cancer Staged    Staging form: Ovary, Fallopian Tube, Primary Peritoneal, AJCC 8th Edition  - Pathologic stage from 2/23/2023: FIGO Stage IC1, calculated as Stage Unknown (pT1c1, pNX, cM0) - Signed by Hector Rodriguez MD on 8/25/2023  Residual tumor (R): R0 - None       2/23/2023 Surgery    ROBOT  RESECTION PELVIC MASS  ROBOTIC HYSTERECTOMY. LEFT SALPINGO-OOPHORECTOMY. RIGHT SAL[PINGECTOMY. INDICATED APPENDECTOMY  CYSTOSCOPY    Operative Findings:  #1.  Approximately 20 to 25 cm large cystic mass replacing the  entire left ovary.  Simple cystic appearing on imaging and confirmed by gross pathologic assessment.  Mass was aspirated for decompression and delivered through the vagina with some spillage of fluid contents and old hemorrhage.  No solid  elements to freeze.  #2.  Grossly normal right fallopian tube and ovary.  #3.  Approximately 8-week size uterus.  #4.  Grossly normal appendix.  However, given the size of the mass and serum mucinous appearance of contents indicated appendectomy was performed.  An appendectomy minimal spillage of fecaliths was noted.  This was all washed out.  However, given gross contamination antibiotics will be empirically prescribed.  #5.  Cystoscopy demonstrated normal bladder mucosa and bilateral patent ureteral urine jets.    Pathology:  Cystic mass had 1.5 focus of granulosa cell tumor.  Capsular rupture consistent with stage I C1.  See subsequent imaging/surgeries for completion of staging.     3/21/2023 Initial Diagnosis    Granulosa cell tumor of left ovary     8/10/2023 Surgery    LAP RIGHT OOPHORECTOMY. PERITONEAL BIOPSIES. OMENTAL BIOPSY    No evidence of residual/metastatic tumor.           Patient ID: Shalonda Akers is a 48 y.o. female  HPI  Patient with stage I C1 granulosa cell tumor of the ovary resected in February 2023.  Contralateral laparoscopic oophorectomy completed in August 2023 with additional staging biopsies.  She has remained with no evidence of disease.  Presents for routine follow-up.  Continues to have some minimal neuropathic discomfort along right groin which is unchanged.  No masses.  Normal bowel and bladder function.  Denies vaginal bleeding, drainage or discharge.  ECOG performance status is 0.  Pre visit inhibin B is less than 7.0.  The following portions of the patient's history were reviewed and updated as appropriate: allergies, current medications, past family history, past medical history, past social history, past surgical history, and problem  "list.    Review of Systems  Per HPI.  Current Outpatient Medications   Medication Sig Dispense Refill   • Dihydrotachysterol (DHT PO) Take by mouth     • lamoTRIgine (LaMICtal) 100 mg tablet Taking 150mg per PT     • multivitamin (THERAGRAN) TABS Take 1 tablet by mouth daily     • Probiotic Product (UP4 PROBIOTICS PO)      • acetaminophen (TYLENOL) 325 mg tablet Take 2 tablets (650 mg total) by mouth every 6 (six) hours 60 tablet 0   • CALCIUM PO Take by mouth     • ibuprofen (MOTRIN) 600 mg tablet Take 1 tablet (600 mg total) by mouth every 6 (six) hours as needed for mild pain 90 tablet 0   • VITAMIN D PO Take by mouth       No current facility-administered medications for this visit.           Objective:    Blood pressure 122/72, pulse 66, temperature 97.9 °F (36.6 °C), temperature source Temporal, height 5' 2.5\" (1.588 m), weight 66.6 kg (146 lb 12.8 oz), last menstrual period 12/26/2022, SpO2 97%.  Body mass index is 26.42 kg/m².  Body surface area is 1.69 meters squared.    Physical Exam  Vitals reviewed. Exam conducted with a chaperone present.   Constitutional:       General: She is not in acute distress.     Appearance: Normal appearance. She is not toxic-appearing.   Pulmonary:      Effort: Pulmonary effort is normal.   Abdominal:      General: Abdomen is flat. There is no distension.      Palpations: There is no mass.      Tenderness: There is no abdominal tenderness. There is no guarding.      Comments: Left groin with no masses, no hernias.   Genitourinary:     Comments: Normal external female genitalia. Normal Bartholin's and Herreid's glands. Normal urethral meatus and no evidence of urethral discharge or masses.  Bladder without fullness mass or tenderness. Vagina without lesion or discharge. No significant pelvic organ prolapse noted.  Cervix and uterus are surgically absent.  Bimanual exam demonstrates no evidence of pelvic masses.  Anus without fissure of lesion.    Musculoskeletal:      Right lower " leg: No edema.      Left lower leg: No edema.       Hector Rodriguez MD, FACOG, FACS  2/23/2024  2:08 PM

## 2024-02-23 NOTE — PROGRESS NOTES
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  This is reassuring.    On exam, there is no evidence of disease.  I plan to see her back in 6 months for routine surveillance visit.    I have ordered inhibin B levels to be drawn prior to our next visit in 6 months.              CHIEF COMPLAINT:   Routine surveillance for granulosa cell tumor of the ovary.    Problem:  Cancer Staging   Granulosa cell tumor of left ovary  Staging form: Ovary, Fallopian Tube, Primary Peritoneal, AJCC 8th Edition  - Pathologic stage from 2/23/2023: FIGO Stage IC1, calculated as Stage Unknown (pT1c1, pNX, cM0) - Signed by Hector Rodriguez MD on 8/25/2023        Previous therapy:  Oncology History   Granulosa cell tumor of left ovary   2/23/2023 -  Cancer Staged    Staging form: Ovary, Fallopian Tube, Primary Peritoneal, AJCC 8th Edition  - Pathologic stage from 2/23/2023: FIGO Stage IC1, calculated as Stage Unknown (pT1c1, pNX, cM0) - Signed by Hector Rodriguez MD on 8/25/2023  Residual tumor (R): R0 - None       2/23/2023 Surgery    ROBOT  RESECTION PELVIC MASS  ROBOTIC HYSTERECTOMY. LEFT SALPINGO-OOPHORECTOMY. RIGHT SAL[PINGECTOMY. INDICATED APPENDECTOMY  CYSTOSCOPY    Operative Findings:  #1.  Approximately 20 to 25 cm large cystic mass replacing the entire left ovary.  Simple cystic appearing on imaging and confirmed by gross pathologic assessment.  Mass was aspirated for decompression and delivered through the vagina with some spillage of fluid contents and old hemorrhage.  No solid  elements to freeze.  #2.  Grossly normal right fallopian tube and ovary.  #3.  Approximately 8-week size uterus.  #4.  Grossly normal appendix.  However, given the size of the mass and serum mucinous appearance of contents indicated appendectomy was performed.  An appendectomy minimal spillage of  fecaliths was noted.  This was all washed out.  However, given gross contamination antibiotics will be empirically prescribed.  #5.  Cystoscopy demonstrated normal bladder mucosa and bilateral patent ureteral urine jets.    Pathology:  Cystic mass had 1.5 focus of granulosa cell tumor.  Capsular rupture consistent with stage I C1.  See subsequent imaging/surgeries for completion of staging.     3/21/2023 Initial Diagnosis    Granulosa cell tumor of left ovary     8/10/2023 Surgery    LAP RIGHT OOPHORECTOMY. PERITONEAL BIOPSIES. OMENTAL BIOPSY    No evidence of residual/metastatic tumor.           Patient ID: Shalonda Akers is a 48 y.o. female  HPI  Patient with stage I C1 granulosa cell tumor of the ovary resected in February 2023.  Contralateral laparoscopic oophorectomy completed in August 2023 with additional staging biopsies.  She has remained with no evidence of disease.  Presents for routine follow-up.  Continues to have some minimal neuropathic discomfort along right groin which is unchanged.  No masses.  Normal bowel and bladder function.  Denies vaginal bleeding, drainage or discharge.  ECOG performance status is 0.  Pre visit inhibin B is less than 7.0.  The following portions of the patient's history were reviewed and updated as appropriate: allergies, current medications, past family history, past medical history, past social history, past surgical history, and problem list.    Review of Systems  Per HPI.  Current Outpatient Medications   Medication Sig Dispense Refill    Dihydrotachysterol (DHT PO) Take by mouth      lamoTRIgine (LaMICtal) 100 mg tablet Taking 150mg per PT      multivitamin (THERAGRAN) TABS Take 1 tablet by mouth daily      Probiotic Product (UP4 PROBIOTICS PO)       acetaminophen (TYLENOL) 325 mg tablet Take 2 tablets (650 mg total) by mouth every 6 (six) hours 60 tablet 0    CALCIUM PO Take by mouth      ibuprofen (MOTRIN) 600 mg tablet Take 1 tablet (600 mg total) by mouth every 6  "(six) hours as needed for mild pain 90 tablet 0    VITAMIN D PO Take by mouth       No current facility-administered medications for this visit.           Objective:    Blood pressure 122/72, pulse 66, temperature 97.9 °F (36.6 °C), temperature source Temporal, height 5' 2.5\" (1.588 m), weight 66.6 kg (146 lb 12.8 oz), last menstrual period 12/26/2022, SpO2 97%.  Body mass index is 26.42 kg/m².  Body surface area is 1.69 meters squared.    Physical Exam  Vitals reviewed. Exam conducted with a chaperone present.   Constitutional:       General: She is not in acute distress.     Appearance: Normal appearance. She is not toxic-appearing.   Pulmonary:      Effort: Pulmonary effort is normal.   Abdominal:      General: Abdomen is flat. There is no distension.      Palpations: There is no mass.      Tenderness: There is no abdominal tenderness. There is no guarding.      Comments: Left groin with no masses, no hernias.   Genitourinary:     Comments: Normal external female genitalia. Normal Bartholin's and Larimore's glands. Normal urethral meatus and no evidence of urethral discharge or masses.  Bladder without fullness mass or tenderness. Vagina without lesion or discharge. No significant pelvic organ prolapse noted.  Cervix and uterus are surgically absent.  Bimanual exam demonstrates no evidence of pelvic masses.  Anus without fissure of lesion.    Musculoskeletal:      Right lower leg: No edema.      Left lower leg: No edema.       Hector Rodriguez MD, FACOG, FACS  2/23/2024  2:08 PM           "

## 2024-08-19 ENCOUNTER — TELEPHONE (OUTPATIENT)
Dept: GYNECOLOGIC ONCOLOGY | Facility: CLINIC | Age: 49
End: 2024-08-19

## 2024-08-19 NOTE — TELEPHONE ENCOUNTER
I left a message reminding the patient to get the Inhibin B blood work done prior to the appointment with Dr. Rodriguez on Friday, 8/23/2024.

## 2024-08-23 ENCOUNTER — TELEPHONE (OUTPATIENT)
Dept: GYNECOLOGIC ONCOLOGY | Facility: CLINIC | Age: 49
End: 2024-08-23

## (undated) DEVICE — 3M™ STERI-STRIP™ REINFORCED ADHESIVE SKIN CLOSURES, R1547, 1/2 IN X 4 IN (12 MM X 100 MM), 6 STRIPS/ENVELOPE: Brand: 3M™ STERI-STRIP™

## (undated) DEVICE — ELECTRO LUBE IS A SINGLE PATIENT USE DEVICE THAT IS INTENDED TO BE USED ON ELECTROSURGICAL ELECTRODES TO REDUCE STICKING.: Brand: KEY SURGICAL ELECTRO LUBE

## (undated) DEVICE — DRAPE EQUIPMENT RF WAND

## (undated) DEVICE — SCD SEQUENTIAL COMPRESSION COMFORT SLEEVE MEDIUM KNEE LENGTH: Brand: KENDALL SCD

## (undated) DEVICE — GLOVE INDICATOR PI UNDERGLOVE SZ 9 BLUE

## (undated) DEVICE — MONOPOLAR CURVED SCISSORS: Brand: ENDOWRIST

## (undated) DEVICE — SUT VLOC 90 2-0 GS-22 12 IN VLOCM2115

## (undated) DEVICE — ASTOUND STANDARD SURGICAL GOWN, XL: Brand: CONVERTORS

## (undated) DEVICE — 3000CC GUARDIAN II: Brand: GUARDIAN

## (undated) DEVICE — CANNULA SEAL

## (undated) DEVICE — UTERINE MANIPULATOR RUMI 6.7 X 8 CM

## (undated) DEVICE — [HIGH FLOW INSUFFLATOR,  DO NOT USE IF PACKAGE IS DAMAGED,  KEEP DRY,  KEEP AWAY FROM SUNLIGHT,  PROTECT FROM HEAT AND RADIOACTIVE SOURCES.]: Brand: PNEUMOSURE

## (undated) DEVICE — BETHLEHEM UNIVERSAL GYN LAP PK: Brand: CARDINAL HEALTH

## (undated) DEVICE — PREMIUM DRY TRAY LF: Brand: MEDLINE INDUSTRIES, INC.

## (undated) DEVICE — PDS II VLT 0 107CM AG ST3: Brand: ENDOLOOP

## (undated) DEVICE — ADHESIVE SKIN HIGH VISCOSITY EXOFIN 1ML

## (undated) DEVICE — VESSEL SEALER EXTEND: Brand: ENDOWRIST

## (undated) DEVICE — OCCLUDER COLPO-PNEUMO

## (undated) DEVICE — EXIDINE 4 PCT

## (undated) DEVICE — TROCAR: Brand: KII FIOS FIRST ENTRY

## (undated) DEVICE — TROCAR: Brand: KII SLEEVE

## (undated) DEVICE — CHLORAPREP HI-LITE 26ML ORANGE

## (undated) DEVICE — SUT VICRYL 0 CT-1 36 IN J946H

## (undated) DEVICE — TISSUE RETRIEVAL SYSTEM: Brand: INZII RETRIEVAL SYSTEM

## (undated) DEVICE — LARGE NEEDLE DRIVER: Brand: ENDOWRIST

## (undated) DEVICE — SUT VICRYL 0 UR-6 27 IN J603H

## (undated) DEVICE — BLUE HEAT SCOPE WARMER

## (undated) DEVICE — NEEDLE COUNTER LG W/RULER

## (undated) DEVICE — GLOVE PI ULTRA TOUCH SZ.8.5

## (undated) DEVICE — 3M™ STERI-DRAPE™ UNDER BUTTOCKS DRAPE WITH POUCH 1084: Brand: STERI-DRAPE™

## (undated) DEVICE — KIT, BETHLEHEM ROBOTIC PROST: Brand: CARDINAL HEALTH

## (undated) DEVICE — ARM DRAPE

## (undated) DEVICE — GLOVE INDICATOR PI UNDERGLOVE SZ 7 BLUE

## (undated) DEVICE — PROGRASP FORCEPS: Brand: ENDOWRIST

## (undated) DEVICE — INTENDED FOR TISSUE SEPARATION, AND OTHER PROCEDURES THAT REQUIRE A SHARP SURGICAL BLADE TO PUNCTURE OR CUT.: Brand: BARD-PARKER SAFETY BLADES SIZE 11, STERILE

## (undated) DEVICE — TIP COVER ACCESSORY

## (undated) DEVICE — GLOVE INDICATOR PI UNDERGLOVE SZ 8.5 BLUE

## (undated) DEVICE — DRAPE TOWEL: Brand: CONVERTORS

## (undated) DEVICE — TRAY FOLEY 16FR URIMETER SILICONE SURESTEP

## (undated) DEVICE — SUT MONOCRYL 4-0 SH 27 IN Y415H

## (undated) DEVICE — VISUALIZATION SYSTEM: Brand: CLEARIFY

## (undated) DEVICE — MAYO STAND COVER: Brand: CONVERTORS

## (undated) DEVICE — 3M™ TEGADERM™ TRANSPARENT FILM DRESSING FRAME STYLE, 1626W, 4 IN X 4-3/4 IN (10 CM X 12 CM), 50/CT 4CT/CASE: Brand: 3M™ TEGADERM™

## (undated) DEVICE — COLUMN DRAPE

## (undated) DEVICE — SUT MONOCRYL 4-0 PS-2 27 IN Y426H

## (undated) DEVICE — TUBING SMOKE EVAC W/FILTRATION DEVICE PLUMEPORT ACTIV

## (undated) DEVICE — SUT STRATAFIX SPIRAL 2-0 CT-1 30 CM SXPP1B410

## (undated) DEVICE — DRAPE LAPAROTOMY W/POUCHES

## (undated) DEVICE — SYRINGE 50ML LL

## (undated) DEVICE — NEEDLE 25G X 1 1/2